# Patient Record
Sex: FEMALE | Race: WHITE | NOT HISPANIC OR LATINO | Employment: OTHER | URBAN - METROPOLITAN AREA
[De-identification: names, ages, dates, MRNs, and addresses within clinical notes are randomized per-mention and may not be internally consistent; named-entity substitution may affect disease eponyms.]

---

## 2017-02-20 ENCOUNTER — HISTORICAL (OUTPATIENT)
Dept: ADMINISTRATIVE | Facility: HOSPITAL | Age: 67
End: 2017-02-20

## 2017-09-26 ENCOUNTER — HISTORICAL (OUTPATIENT)
Dept: ADMINISTRATIVE | Facility: HOSPITAL | Age: 67
End: 2017-09-26

## 2017-09-26 LAB
ABS NEUT (OLG): 3.63 X10(3)/MCL (ref 2.1–9.2)
ANISOCYTOSIS BLD QL SMEAR: 1
BASOPHILS # BLD AUTO: 0 X10(3)/MCL (ref 0–0.2)
BASOPHILS NFR BLD AUTO: 1 %
EOSINOPHIL # BLD AUTO: 0.1 X10(3)/MCL (ref 0–0.9)
EOSINOPHIL NFR BLD AUTO: 1 %
ERYTHROCYTE [DISTWIDTH] IN BLOOD BY AUTOMATED COUNT: 15.4 % (ref 11.5–17)
HCT VFR BLD AUTO: 29.7 % (ref 37–47)
HGB BLD-MCNC: 9 GM/DL (ref 12–16)
IRON SATN MFR SERPL: 3.8 % (ref 20–50)
IRON SERPL-MCNC: 17 MCG/DL (ref 50–175)
LYMPHOCYTES # BLD AUTO: 1.9 X10(3)/MCL (ref 0.6–4.6)
LYMPHOCYTES NFR BLD AUTO: 30 % (ref 13–40)
MCH RBC QN AUTO: 24.9 PG (ref 27–31)
MCHC RBC AUTO-ENTMCNC: 30.3 GM/DL (ref 33–36)
MCV RBC AUTO: 82 FL (ref 80–94)
MICROCYTES BLD QL SMEAR: 1
MONOCYTES # BLD AUTO: 0.5 X10(3)/MCL (ref 0.1–1.3)
MONOCYTES NFR BLD AUTO: 8 %
NEUTROPHILS # BLD AUTO: 3.63 X10(3)/MCL (ref 2.1–9.2)
NEUTROPHILS NFR BLD AUTO: 59 %
OVALOCYTES BLD QL SMEAR: 1
PLATELET # BLD AUTO: 323 X10(3)/MCL (ref 130–400)
PLATELET # BLD EST: NORMAL 10*3/UL
PMV BLD AUTO: 9.7 FL (ref 7.4–10.4)
POIKILOCYTOSIS BLD QL SMEAR: 1
RBC # BLD AUTO: 3.62 X10(6)/MCL (ref 4.2–5.4)
RBC MORPH BLD: NORMAL
RET# (OHS): 0.05 X10^6/ML (ref 0.02–0.08)
RETICULOCYTE COUNT AUTOMATED (OLG): 1.3 % (ref 1.1–2.1)
SCHISTOCYTES BLD QL AUTO: 1
TIBC SERPL-MCNC: 450 MCG/DL (ref 250–450)
TRANSFERRIN SERPL-MCNC: 368 MG/DL (ref 200–360)
VIT B12 SERPL-MCNC: 626 PG/ML (ref 193–986)
WBC # SPEC AUTO: 6.2 X10(3)/MCL (ref 4.5–11.5)

## 2017-09-29 ENCOUNTER — HISTORICAL (OUTPATIENT)
Dept: ADMINISTRATIVE | Facility: HOSPITAL | Age: 67
End: 2017-09-29

## 2017-09-29 LAB
COLOR STL: NORMAL
CONSISTENCY STL: NORMAL

## 2017-10-24 ENCOUNTER — HISTORICAL (OUTPATIENT)
Dept: ADMINISTRATIVE | Facility: HOSPITAL | Age: 67
End: 2017-10-24

## 2017-10-24 LAB
ABS NEUT (OLG): 2.99 X10(3)/MCL (ref 2.1–9.2)
BASOPHILS # BLD AUTO: 0.1 X10(3)/MCL (ref 0–0.2)
BASOPHILS NFR BLD AUTO: 1 %
EOSINOPHIL # BLD AUTO: 0.1 X10(3)/MCL (ref 0–0.9)
EOSINOPHIL NFR BLD AUTO: 2 %
ERYTHROCYTE [DISTWIDTH] IN BLOOD BY AUTOMATED COUNT: 18.9 % (ref 11.5–17)
HCT VFR BLD AUTO: 31.3 % (ref 37–47)
HGB BLD-MCNC: 9.2 GM/DL (ref 12–16)
IRON SATN MFR SERPL: 18.8 % (ref 20–50)
IRON SERPL-MCNC: 81 MCG/DL (ref 50–175)
LYMPHOCYTES # BLD AUTO: 1.6 X10(3)/MCL (ref 0.6–4.6)
LYMPHOCYTES NFR BLD AUTO: 30 %
MCH RBC QN AUTO: 24.7 PG (ref 27–31)
MCHC RBC AUTO-ENTMCNC: 29.4 GM/DL (ref 33–36)
MCV RBC AUTO: 84.1 FL (ref 80–94)
MONOCYTES # BLD AUTO: 0.5 X10(3)/MCL (ref 0.1–1.3)
MONOCYTES NFR BLD AUTO: 9 %
NEUTROPHILS # BLD AUTO: 2.99 X10(3)/MCL (ref 1.4–7.9)
NEUTROPHILS NFR BLD AUTO: 57 %
PLATELET # BLD AUTO: 332 X10(3)/MCL (ref 130–400)
PMV BLD AUTO: 9.6 FL (ref 9.4–12.4)
RBC # BLD AUTO: 3.72 X10(6)/MCL (ref 4.2–5.4)
TIBC SERPL-MCNC: 431 MCG/DL (ref 250–450)
TRANSFERRIN SERPL-MCNC: 356 MG/DL (ref 200–360)
WBC # SPEC AUTO: 5.2 X10(3)/MCL (ref 4.5–11.5)

## 2021-08-16 LAB — BCS RECOMMENDATION EXT: NORMAL

## 2021-09-03 LAB — BMD RECOMMENDATION EXT: NORMAL

## 2021-10-04 LAB
LEFT EYE DM RETINOPATHY: NEGATIVE
RIGHT EYE DM RETINOPATHY: NEGATIVE

## 2021-10-11 LAB — CRC RECOMMENDATION EXT: NORMAL

## 2022-03-23 LAB — HBA1C MFR BLD: 7.9 % (ref 4–6)

## 2022-07-26 RX ORDER — METFORMIN HYDROCHLORIDE 1000 MG/1
TABLET ORAL
Qty: 180 TABLET | Refills: 3 | Status: SHIPPED | OUTPATIENT
Start: 2022-07-26 | End: 2023-08-16

## 2022-07-26 RX ORDER — LISINOPRIL 40 MG/1
TABLET ORAL
Qty: 90 TABLET | Refills: 3 | Status: SHIPPED | OUTPATIENT
Start: 2022-07-26 | End: 2023-08-16

## 2022-07-26 RX ORDER — HYDROCHLOROTHIAZIDE 25 MG/1
TABLET ORAL
Qty: 90 TABLET | Refills: 3 | Status: SHIPPED | OUTPATIENT
Start: 2022-07-26 | End: 2023-08-16

## 2022-10-08 ENCOUNTER — DOCUMENTATION ONLY (OUTPATIENT)
Dept: INTERNAL MEDICINE | Facility: CLINIC | Age: 72
End: 2022-10-08
Payer: MEDICARE

## 2022-10-08 DIAGNOSIS — E03.9 HYPOTHYROIDISM, UNSPECIFIED TYPE: Primary | ICD-10-CM

## 2022-10-11 RX ORDER — LEVOTHYROXINE SODIUM 112 UG/1
TABLET ORAL
Qty: 90 TABLET | Refills: 3 | Status: SHIPPED | OUTPATIENT
Start: 2022-10-11 | End: 2023-10-17

## 2022-10-31 ENCOUNTER — TELEPHONE (OUTPATIENT)
Dept: INTERNAL MEDICINE | Facility: CLINIC | Age: 72
End: 2022-10-31
Payer: MEDICARE

## 2022-11-01 LAB — HBA1C MFR BLD: 7.1 % (ref 4–6)

## 2022-11-07 ENCOUNTER — OFFICE VISIT (OUTPATIENT)
Dept: INTERNAL MEDICINE | Facility: CLINIC | Age: 72
End: 2022-11-07
Payer: MEDICARE

## 2022-11-07 VITALS
TEMPERATURE: 97 F | BODY MASS INDEX: 28.7 KG/M2 | OXYGEN SATURATION: 96 % | DIASTOLIC BLOOD PRESSURE: 69 MMHG | WEIGHT: 152 LBS | HEIGHT: 61 IN | HEART RATE: 70 BPM | SYSTOLIC BLOOD PRESSURE: 134 MMHG

## 2022-11-07 DIAGNOSIS — E03.9 HYPOTHYROIDISM, UNSPECIFIED TYPE: Chronic | ICD-10-CM

## 2022-11-07 DIAGNOSIS — G47.33 OSA (OBSTRUCTIVE SLEEP APNEA): Chronic | ICD-10-CM

## 2022-11-07 DIAGNOSIS — Z79.4 TYPE 2 DIABETES MELLITUS WITHOUT COMPLICATION, WITH LONG-TERM CURRENT USE OF INSULIN: Chronic | ICD-10-CM

## 2022-11-07 DIAGNOSIS — E11.9 TYPE 2 DIABETES MELLITUS WITHOUT COMPLICATION, WITH LONG-TERM CURRENT USE OF INSULIN: Chronic | ICD-10-CM

## 2022-11-07 DIAGNOSIS — I25.10 CORONARY ARTERY DISEASE, UNSPECIFIED VESSEL OR LESION TYPE, UNSPECIFIED WHETHER ANGINA PRESENT, UNSPECIFIED WHETHER NATIVE OR TRANSPLANTED HEART: Primary | Chronic | ICD-10-CM

## 2022-11-07 DIAGNOSIS — E78.2 MIXED HYPERLIPIDEMIA: ICD-10-CM

## 2022-11-07 DIAGNOSIS — I73.9 PAD (PERIPHERAL ARTERY DISEASE): Chronic | ICD-10-CM

## 2022-11-07 DIAGNOSIS — Z79.4 TYPE 2 DIABETES MELLITUS WITHOUT COMPLICATION, WITH LONG-TERM CURRENT USE OF INSULIN: ICD-10-CM

## 2022-11-07 DIAGNOSIS — I10 PRIMARY HYPERTENSION: Chronic | ICD-10-CM

## 2022-11-07 DIAGNOSIS — I25.10 CORONARY ARTERY DISEASE, UNSPECIFIED VESSEL OR LESION TYPE, UNSPECIFIED WHETHER ANGINA PRESENT, UNSPECIFIED WHETHER NATIVE OR TRANSPLANTED HEART: ICD-10-CM

## 2022-11-07 DIAGNOSIS — E78.2 MIXED HYPERLIPIDEMIA: Chronic | ICD-10-CM

## 2022-11-07 DIAGNOSIS — E11.9 TYPE 2 DIABETES MELLITUS WITHOUT COMPLICATION, WITH LONG-TERM CURRENT USE OF INSULIN: ICD-10-CM

## 2022-11-07 PROCEDURE — 99214 OFFICE O/P EST MOD 30 MIN: CPT | Mod: ,,,

## 2022-11-07 PROCEDURE — 99214 PR OFFICE/OUTPT VISIT, EST, LEVL IV, 30-39 MIN: ICD-10-PCS | Mod: ,,,

## 2022-11-07 RX ORDER — METOPROLOL TARTRATE 25 MG/1
25 TABLET, FILM COATED ORAL DAILY
COMMUNITY
Start: 2022-08-17 | End: 2023-12-04

## 2022-11-07 RX ORDER — EVOLOCUMAB 420 MG/3.5
KIT SUBCUTANEOUS
COMMUNITY
Start: 2022-10-24

## 2022-11-07 RX ORDER — ATORVASTATIN CALCIUM 40 MG/1
40 TABLET, FILM COATED ORAL DAILY
COMMUNITY
Start: 2022-11-06

## 2022-11-07 RX ORDER — AMLODIPINE BESYLATE 5 MG/1
5 TABLET ORAL DAILY
COMMUNITY
Start: 2022-07-27

## 2022-11-07 RX ORDER — ASPIRIN 81 MG/1
81 TABLET ORAL DAILY
COMMUNITY

## 2022-11-07 RX ORDER — CLOPIDOGREL BISULFATE 75 MG/1
75 TABLET ORAL DAILY
COMMUNITY
Start: 2022-07-15

## 2022-11-07 RX ORDER — INSULIN GLARGINE 100 [IU]/ML
40 INJECTION, SOLUTION SUBCUTANEOUS DAILY
COMMUNITY
Start: 2022-11-06 | End: 2023-04-24 | Stop reason: SDUPTHER

## 2022-11-07 NOTE — ASSESSMENT & PLAN NOTE
-awaiting most recent HGB A1c salt, request records   -Currently on Jardiance 10 mg daily, Lantus 40 units daily, metformin 1000 mg b.i.d., continue for now and adjust if needed   -low carb diet  -reports up-to-date on diabetic eye exam

## 2022-11-07 NOTE — ASSESSMENT & PLAN NOTE
- did complete TSH prior to visit, requesting records   -continue levothyroxine 112 mcg for now and titrate as needed

## 2022-11-07 NOTE — PROGRESS NOTES
Patient ID: Alexys Madera is a 72 y.o. female.    Chief Complaint: Follow-up     72-year-old female here today for 6 month follow-up visit.  Medical comorbidities include CAD s/p CABG, PAD, HTN, HLD,DMII,  hypothyroidism, and DILLON.  Since last visit patient reports has been fairly well without any acute injury or major illness.  Did recently complete some blood work, however have not receive from [facility].  Staff requested.  Age-appropriate screenings reviewed and up-to-date.  Immunizations reviewed, is up for high-dose influenza however clinic is out of stock.  Encouraged to obtain at local pharmacy.  Does have type 2 diabetes currently on metformin, Jardiance, and Lantus.  Reports typical blood sugars in the 80-90s in the morning. Discussed  reviewing HGB A1c and TSH level once received in given her call back.  Patient understanding.  Denies any acute medical complaints for today's visit and is feeling generally well.      Wellness: Next visit      MEDICAL HISTORY:    Past Medical History:   Diagnosis Date    CAD (coronary artery disease) 11/7/2022    Hypothyroidism 11/7/2022    Mixed hyperlipidemia 11/7/2022    DILLON (obstructive sleep apnea) 11/7/2022    PAD (peripheral artery disease) 11/7/2022    Primary hypertension 11/7/2022    Type 2 diabetes mellitus without complication, with long-term current use of insulin 11/7/2022      Past Surgical History:   Procedure Laterality Date    CARDIAC SURGERY        Social History     Tobacco Use    Smoking status: Never    Smokeless tobacco: Never   Substance Use Topics    Alcohol use: Not Currently          Health Maintenance Due   Topic Date Due    Hepatitis C Screening  Never done    Shingles Vaccine (2 of 3) 12/08/2016    Pneumococcal Vaccines (Age 65+) (2 - PCV) 09/29/2017    COVID-19 Vaccine (4 - Booster for Moderna series) 01/12/2022    Influenza Vaccine (1) 09/01/2022          Patient Care Team:  Edenilson Redmond MD as PCP - General (Internal  "Medicine)  MD Ximena Zamudio MD as Consulting Physician (Cardiology)      Review of Systems   Constitutional:  Negative for fatigue and fever.   HENT:  Negative for congestion, rhinorrhea, sore throat and trouble swallowing.    Eyes:  Negative for redness and visual disturbance.   Respiratory:  Negative for cough, chest tightness and shortness of breath.    Cardiovascular:  Negative for chest pain and palpitations.   Gastrointestinal:  Negative for abdominal pain, constipation, diarrhea, nausea and vomiting.   Genitourinary:  Negative for dysuria, flank pain, frequency and urgency.   Musculoskeletal:  Negative for arthralgias, gait problem and myalgias.   Skin:  Negative for rash and wound.   Neurological:  Negative for facial asymmetry, speech difficulty, weakness and headaches.   All other systems reviewed and are negative.    Objective:   /69 (BP Location: Right arm, Patient Position: Sitting, BP Method: Large (Automatic))   Pulse 70   Temp 97.3 °F (36.3 °C)   Ht 5' 1" (1.549 m)   Wt 68.9 kg (152 lb)   SpO2 96%   BMI 28.72 kg/m²      Physical Exam  Constitutional:       General: She is not in acute distress.     Appearance: Normal appearance.   HENT:      Right Ear: Tympanic membrane, ear canal and external ear normal.      Left Ear: Tympanic membrane, ear canal and external ear normal.      Nose: Nose normal.      Mouth/Throat:      Mouth: Mucous membranes are moist.      Pharynx: Oropharynx is clear.   Eyes:      Extraocular Movements: Extraocular movements intact.      Conjunctiva/sclera: Conjunctivae normal.      Pupils: Pupils are equal, round, and reactive to light.   Cardiovascular:      Rate and Rhythm: Normal rate and regular rhythm.      Pulses: Normal pulses.      Heart sounds: Normal heart sounds. No murmur heard.    No gallop.   Pulmonary:      Effort: Pulmonary effort is normal.      Breath sounds: Normal breath sounds. No wheezing.   Abdominal:      General: Bowel " sounds are normal. There is no distension.      Palpations: Abdomen is soft. There is no mass.      Tenderness: There is no abdominal tenderness. There is no guarding.   Musculoskeletal:         General: Normal range of motion.      Right foot: Normal range of motion. No deformity or foot drop.      Left foot: Normal range of motion. No deformity or foot drop.   Feet:      Right foot:      Protective Sensation: 3 sites tested.  3 sites sensed.      Skin integrity: Skin integrity normal. No ulcer, skin breakdown or erythema.      Toenail Condition: Right toenails are normal.      Left foot:      Protective Sensation: 3 sites tested.  3 sites sensed.      Skin integrity: Skin integrity normal. No ulcer, skin breakdown or erythema.      Toenail Condition: Left toenails are normal.   Skin:     General: Skin is warm and dry.   Neurological:      Mental Status: She is alert. Mental status is at baseline.      Sensory: No sensory deficit.      Motor: No weakness.         Assessment:       ICD-10-CM ICD-9-CM   1. Type 2 diabetes mellitus without complication, with long-term current use of insulin  E11.9 250.00    Z79.4 V58.67   2. Hypothyroidism, unspecified type  E03.9 244.9   3. Coronary artery disease, unspecified vessel or lesion type, unspecified whether angina present, unspecified whether native or transplanted heart  I25.10 414.00   4. PAD (peripheral artery disease)  I73.9 443.9   5. Mixed hyperlipidemia  E78.2 272.2   6. Primary hypertension  I10 401.9   7. DILLON (obstructive sleep apnea)  G47.33 327.23        Plan:     Problem List Items Addressed This Visit          Cardiac/Vascular    CAD (coronary artery disease) (Chronic)     S/p CABG and establish cardiology   -currently on ASA, beta-blocker, Ace, and Repatha, continue         Mixed hyperlipidemia (Chronic)      -currently on Repatha, continue  -low-cholesterol diet         Primary hypertension (Chronic)      -currently well controlled on amlodipine 5 mg,  lisinopril 40 mg and HCTZ 25 mg, continue  -low-sodium diet         PAD (peripheral artery disease) (Chronic)      -currently on ASA and Repatha, continue            Endocrine    Hypothyroidism (Chronic)      - did complete TSH prior to visit, requesting records   -continue levothyroxine 112 mcg for now and titrate as needed         Type 2 diabetes mellitus without complication, with long-term current use of insulin (Chronic)      -awaiting most recent HGB A1c salt, request records   -Currently on Jardiance 10 mg daily, Lantus 40 units daily, metformin 1000 mg b.i.d., continue for now and adjust if needed   -low carb diet  -reports up-to-date on diabetic eye exam         Relevant Medications    LANTUS SOLOSTAR U-100 INSULIN glargine 100 units/mL SubQ pen       Other    DILLON (obstructive sleep apnea) (Chronic)      -encourage device compliance   -device hygiene encouraged as well               Follow up in about 6 months (around 5/7/2023) for Medicare Wellness.   -plan specifics discussed above          Medication List with Changes/Refills   Current Medications    AMLODIPINE (NORVASC) 5 MG TABLET    Take 5 mg by mouth once daily.    ASPIRIN (ECOTRIN) 81 MG EC TABLET    Take 81 mg by mouth once daily.    ATORVASTATIN (LIPITOR) 40 MG TABLET    Take 40 mg by mouth once daily.    CLOPIDOGREL (PLAVIX) 75 MG TABLET    Take 75 mg by mouth once daily.    EMPAGLIFLOZIN (JARDIANCE) 10 MG TABLET    Take 10 mg by mouth once daily.    HYDROCHLOROTHIAZIDE (HYDRODIURIL) 25 MG TABLET    TAKE 1 TABLET BY MOUTH  DAILY    LANTUS SOLOSTAR U-100 INSULIN GLARGINE 100 UNITS/ML SUBQ PEN    Inject 40 Units into the skin once daily.    LEVOTHYROXINE (SYNTHROID) 112 MCG TABLET    TAKE 1 TABLET BY MOUTH  DAILY    LISINOPRIL (PRINIVIL,ZESTRIL) 40 MG TABLET    TAKE 1 TABLET BY MOUTH  DAILY    METFORMIN (GLUCOPHAGE) 1000 MG TABLET    TAKE 1 TABLET BY MOUTH  TWICE DAILY    METOPROLOL TARTRATE (LOPRESSOR) 25 MG TABLET    Take 25 mg by mouth once  daily.    REPATHA PUSHTRONEX 420 MG/3.5 ML INJT    SMARTSI SUB-Q Once a Month

## 2022-11-07 NOTE — PROGRESS NOTES
Faxed in HGB A1c reviewed indicating 7.1.  Spoke to patient discussed results.  Currently on Jardiance 10 mg will increase dose to Jardiance 25 mg daily.

## 2022-11-14 ENCOUNTER — DOCUMENTATION ONLY (OUTPATIENT)
Dept: INTERNAL MEDICINE | Facility: CLINIC | Age: 72
End: 2022-11-14
Payer: MEDICARE

## 2023-04-24 DIAGNOSIS — Z79.4 TYPE 2 DIABETES MELLITUS WITHOUT COMPLICATION, WITH LONG-TERM CURRENT USE OF INSULIN: Primary | ICD-10-CM

## 2023-04-24 DIAGNOSIS — E11.9 TYPE 2 DIABETES MELLITUS WITHOUT COMPLICATION, WITH LONG-TERM CURRENT USE OF INSULIN: Primary | ICD-10-CM

## 2023-04-24 RX ORDER — INSULIN GLARGINE 100 [IU]/ML
40 INJECTION, SOLUTION SUBCUTANEOUS DAILY
Qty: 3 ML | Refills: 3 | Status: SHIPPED | OUTPATIENT
Start: 2023-04-24 | End: 2023-05-11

## 2023-05-01 ENCOUNTER — TELEPHONE (OUTPATIENT)
Dept: INTERNAL MEDICINE | Facility: CLINIC | Age: 73
End: 2023-05-01
Payer: MEDICARE

## 2023-05-03 ENCOUNTER — LAB VISIT (OUTPATIENT)
Dept: LAB | Facility: HOSPITAL | Age: 73
End: 2023-05-03
Payer: MEDICARE

## 2023-05-03 DIAGNOSIS — I25.10 CORONARY ARTERY DISEASE, UNSPECIFIED VESSEL OR LESION TYPE, UNSPECIFIED WHETHER ANGINA PRESENT, UNSPECIFIED WHETHER NATIVE OR TRANSPLANTED HEART: ICD-10-CM

## 2023-05-03 DIAGNOSIS — G47.33 OSA (OBSTRUCTIVE SLEEP APNEA): ICD-10-CM

## 2023-05-03 DIAGNOSIS — E78.2 MIXED HYPERLIPIDEMIA: ICD-10-CM

## 2023-05-03 DIAGNOSIS — E11.9 TYPE 2 DIABETES MELLITUS WITHOUT COMPLICATION, WITH LONG-TERM CURRENT USE OF INSULIN: ICD-10-CM

## 2023-05-03 DIAGNOSIS — I73.9 PAD (PERIPHERAL ARTERY DISEASE): ICD-10-CM

## 2023-05-03 DIAGNOSIS — E03.9 HYPOTHYROIDISM, UNSPECIFIED TYPE: ICD-10-CM

## 2023-05-03 DIAGNOSIS — Z79.4 TYPE 2 DIABETES MELLITUS WITHOUT COMPLICATION, WITH LONG-TERM CURRENT USE OF INSULIN: ICD-10-CM

## 2023-05-03 DIAGNOSIS — I10 PRIMARY HYPERTENSION: ICD-10-CM

## 2023-05-03 PROCEDURE — 85025 COMPLETE CBC W/AUTO DIFF WBC: CPT

## 2023-05-03 PROCEDURE — 84443 ASSAY THYROID STIM HORMONE: CPT

## 2023-05-03 PROCEDURE — 36415 COLL VENOUS BLD VENIPUNCTURE: CPT

## 2023-05-03 PROCEDURE — 83036 HEMOGLOBIN GLYCOSYLATED A1C: CPT

## 2023-05-03 PROCEDURE — 80053 COMPREHEN METABOLIC PANEL: CPT

## 2023-05-03 PROCEDURE — 80061 LIPID PANEL: CPT

## 2023-05-08 ENCOUNTER — OFFICE VISIT (OUTPATIENT)
Dept: INTERNAL MEDICINE | Facility: CLINIC | Age: 73
End: 2023-05-08
Payer: MEDICARE

## 2023-05-08 VITALS
SYSTOLIC BLOOD PRESSURE: 132 MMHG | RESPIRATION RATE: 18 BRPM | HEART RATE: 80 BPM | WEIGHT: 150.38 LBS | DIASTOLIC BLOOD PRESSURE: 78 MMHG | TEMPERATURE: 97 F | OXYGEN SATURATION: 98 % | BODY MASS INDEX: 28.42 KG/M2

## 2023-05-08 DIAGNOSIS — E11.9 TYPE 2 DIABETES MELLITUS WITHOUT COMPLICATION, WITH LONG-TERM CURRENT USE OF INSULIN: Chronic | ICD-10-CM

## 2023-05-08 DIAGNOSIS — I10 PRIMARY HYPERTENSION: Chronic | ICD-10-CM

## 2023-05-08 DIAGNOSIS — E78.2 MIXED HYPERLIPIDEMIA: Chronic | ICD-10-CM

## 2023-05-08 DIAGNOSIS — Z23 NEED FOR PNEUMOCOCCAL VACCINATION: Primary | ICD-10-CM

## 2023-05-08 DIAGNOSIS — Z79.4 TYPE 2 DIABETES MELLITUS WITHOUT COMPLICATION, WITH LONG-TERM CURRENT USE OF INSULIN: Chronic | ICD-10-CM

## 2023-05-08 DIAGNOSIS — G47.33 OSA (OBSTRUCTIVE SLEEP APNEA): Chronic | ICD-10-CM

## 2023-05-08 DIAGNOSIS — Z00.00 MEDICARE ANNUAL WELLNESS VISIT, SUBSEQUENT: ICD-10-CM

## 2023-05-08 DIAGNOSIS — I25.10 CORONARY ARTERY DISEASE, UNSPECIFIED VESSEL OR LESION TYPE, UNSPECIFIED WHETHER ANGINA PRESENT, UNSPECIFIED WHETHER NATIVE OR TRANSPLANTED HEART: Chronic | ICD-10-CM

## 2023-05-08 DIAGNOSIS — I73.9 PAD (PERIPHERAL ARTERY DISEASE): Chronic | ICD-10-CM

## 2023-05-08 DIAGNOSIS — E03.9 HYPOTHYROIDISM, UNSPECIFIED TYPE: Chronic | ICD-10-CM

## 2023-05-08 LAB
ALBUMIN SERPL-MCNC: 3.9 G/DL (ref 3.4–4.8)
ALBUMIN/GLOB SERPL: 1.3 RATIO (ref 1.1–2)
ALP SERPL-CCNC: 54 UNIT/L (ref 40–150)
ALT SERPL-CCNC: 19 UNIT/L (ref 0–55)
AST SERPL-CCNC: 16 UNIT/L (ref 5–34)
BASOPHILS # BLD AUTO: 0.05 X10(3)/MCL
BASOPHILS NFR BLD AUTO: 0.8 %
BILIRUBIN DIRECT+TOT PNL SERPL-MCNC: 0.4 MG/DL
BUN SERPL-MCNC: 27.7 MG/DL (ref 9.8–20.1)
CALCIUM SERPL-MCNC: 9.7 MG/DL (ref 8.4–10.2)
CHLORIDE SERPL-SCNC: 106 MMOL/L (ref 98–107)
CHOLEST SERPL-MCNC: 123 MG/DL
CHOLEST/HDLC SERPL: 3 {RATIO} (ref 0–5)
CO2 SERPL-SCNC: 30 MMOL/L (ref 23–31)
CREAT SERPL-MCNC: 0.79 MG/DL (ref 0.55–1.02)
EOSINOPHIL # BLD AUTO: 0.23 X10(3)/MCL (ref 0–0.9)
EOSINOPHIL NFR BLD AUTO: 3.8 %
ERYTHROCYTE [DISTWIDTH] IN BLOOD BY AUTOMATED COUNT: 12.8 % (ref 11.5–17)
EST. AVERAGE GLUCOSE BLD GHB EST-MCNC: 168.6 MG/DL
GFR SERPLBLD CREATININE-BSD FMLA CKD-EPI: >60 MLS/MIN/1.73/M2
GLOBULIN SER-MCNC: 3.1 GM/DL (ref 2.4–3.5)
GLUCOSE SERPL-MCNC: 138 MG/DL (ref 82–115)
HBA1C MFR BLD: 7.5 %
HCT VFR BLD AUTO: 43.8 % (ref 37–47)
HDLC SERPL-MCNC: 40 MG/DL (ref 35–60)
HGB BLD-MCNC: 14 G/DL (ref 12–16)
IMM GRANULOCYTES # BLD AUTO: 0.03 X10(3)/MCL (ref 0–0.04)
IMM GRANULOCYTES NFR BLD AUTO: 0.5 %
LDLC SERPL CALC-MCNC: 58 MG/DL (ref 50–140)
LYMPHOCYTES # BLD AUTO: 2.08 X10(3)/MCL (ref 0.6–4.6)
LYMPHOCYTES NFR BLD AUTO: 34.6 %
MCH RBC QN AUTO: 31.9 PG (ref 27–31)
MCHC RBC AUTO-ENTMCNC: 32 G/DL (ref 33–36)
MCV RBC AUTO: 99.8 FL (ref 80–94)
MONOCYTES # BLD AUTO: 0.43 X10(3)/MCL (ref 0.1–1.3)
MONOCYTES NFR BLD AUTO: 7.1 %
NEUTROPHILS # BLD AUTO: 3.2 X10(3)/MCL (ref 2.1–9.2)
NEUTROPHILS NFR BLD AUTO: 53.2 %
NRBC BLD AUTO-RTO: 0 %
PLATELET # BLD AUTO: 306 X10(3)/MCL (ref 130–400)
PMV BLD AUTO: 9.8 FL (ref 7.4–10.4)
POTASSIUM SERPL-SCNC: 3.9 MMOL/L (ref 3.5–5.1)
PROT SERPL-MCNC: 7 GM/DL (ref 5.8–7.6)
RBC # BLD AUTO: 4.39 X10(6)/MCL (ref 4.2–5.4)
SODIUM SERPL-SCNC: 143 MMOL/L (ref 136–145)
TRIGL SERPL-MCNC: 127 MG/DL (ref 37–140)
TSH SERPL-ACNC: 1.71 UIU/ML (ref 0.35–4.94)
VLDLC SERPL CALC-MCNC: 25 MG/DL
WBC # SPEC AUTO: 6.02 X10(3)/MCL (ref 4.5–11.5)

## 2023-05-08 PROCEDURE — G0009 PNEUMOCOCCAL CONJUGATE VACCINE 20-VALENT: ICD-10-PCS | Mod: ,,,

## 2023-05-08 PROCEDURE — 90677 PNEUMOCOCCAL CONJUGATE VACCINE 20-VALENT: ICD-10-PCS | Mod: ,,,

## 2023-05-08 PROCEDURE — G0439 PR MEDICARE ANNUAL WELLNESS SUBSEQUENT VISIT: ICD-10-PCS | Mod: ,,,

## 2023-05-08 PROCEDURE — G0009 ADMIN PNEUMOCOCCAL VACCINE: HCPCS | Mod: ,,,

## 2023-05-08 PROCEDURE — G0439 PPPS, SUBSEQ VISIT: HCPCS | Mod: ,,,

## 2023-05-08 PROCEDURE — 90677 PCV20 VACCINE IM: CPT | Mod: ,,,

## 2023-05-08 RX ORDER — AMOXICILLIN 500 MG/1
500 CAPSULE ORAL 3 TIMES DAILY
COMMUNITY
Start: 2023-05-04 | End: 2023-08-14

## 2023-05-08 NOTE — ASSESSMENT & PLAN NOTE
-s/p CABG and established with Cardiology   -currently on ASA, Plavix, beta-blocker, Ace, and Repatha, continue

## 2023-05-08 NOTE — ASSESSMENT & PLAN NOTE
-awaiting most recent HGB A1c, error in lab.  Likely will have to get redrawn.    -currently on Jardiance 25 mg, Lantus 40 units, metformin b.i.d., continue  -low carb diet   -DM eye exam up-to-date   -complete diabetic foot exam with Podiatry

## 2023-05-08 NOTE — ASSESSMENT & PLAN NOTE
-stable and well controlled   -currently on amlodipine 5, metoprolol tartrate 25 mg, lisinopril 40, HCTZ 25, continue  -low-sodium diet

## 2023-05-08 NOTE — PROGRESS NOTES
"     Patient Care Team:  Edenilson Redmond MD as PCP - General (Internal Medicine)  MD Ximena Zamudio MD as Consulting Physician (Cardiology)      Patient ID: Alexys Madera is a 73 y.o. female.    Chief Complaint: Medicare AWV (Pt doing well)      HPI:      73-year-old female here today for wellness visit.  Medical comorbidities include CAD s/p CABG, PAD, HTN, HLD,DMII,  hypothyroidism, and DILLON.  Since last visit patient reports has been fairly well without any acute injury or major illness.  Did recently complete some blood work, however labs still stating "in process" for labs completed 5 days ago.  Apologize to patient on behalf of lab, likely not enough blood or sample loss.  We will reach out to lab and find out white blood work is still sitting in process after 5 days.  Does have results for UA with positive urine culture for E coli, however noted to be asymptomatic UTI therefore will hold off on treating.  Previously with elevated HGB A1c 7.1, no recent A1c as stated before.  Is currently on Jardiance 25 mg stating only has few medications left.  Unable to feel since stating assistance program forms not fully filled.  Will have staff look into.  Given 2 week samples of Jardiance to hold over until paperwork be completed by our staff. Immunizations reviewed and patient agreeable to have pneumococcal vaccine today.  Age-appropriate screenings reviewed and up-to-date.Denies any acute medical complaints for today's visit and is feeling generally well.       Wellness:  Today 05/08/2023      MEDICAL HISTORY:    Past Medical History:   Diagnosis Date    CAD (coronary artery disease) 11/7/2022    Hypothyroidism 11/7/2022    Mixed hyperlipidemia 11/7/2022    DILLON (obstructive sleep apnea) 11/7/2022    PAD (peripheral artery disease) 11/7/2022    Primary hypertension 11/7/2022    Type 2 diabetes mellitus without complication, with long-term current use of insulin 11/7/2022      Past Surgical " History:   Procedure Laterality Date    CARDIAC SURGERY        Social History     Tobacco Use    Smoking status: Never    Smokeless tobacco: Never   Substance Use Topics    Alcohol use: Not Currently    Drug use: Never          Health Maintenance Due   Topic Date Due    Hepatitis C Screening  Never done    Diabetes Urine Screening  Never done    Foot Exam  Never done    Shingles Vaccine (2 of 3) 12/08/2016        SUBJECTIVE:     Review of Systems   Constitutional:  Negative for fatigue and fever.   HENT:  Negative for congestion, rhinorrhea, sore throat and trouble swallowing.    Eyes:  Negative for redness and visual disturbance.   Respiratory:  Negative for cough, chest tightness and shortness of breath.    Cardiovascular:  Negative for chest pain and palpitations.   Gastrointestinal:  Negative for abdominal pain, constipation, diarrhea, nausea and vomiting.   Genitourinary:  Negative for dysuria, flank pain, frequency and urgency.   Musculoskeletal:  Negative for arthralgias, gait problem and myalgias.   Skin:  Negative for rash and wound.   Neurological:  Negative for facial asymmetry, speech difficulty, weakness and headaches.   All other systems reviewed and are negative.      Patient Reported Health Risk Assessment  What [age]  Are you male or female?: Female  During the past four weeks, how much have you been bothered by emotional problems such as feeling anxious, depressed, irritable, sad, or downhearted and blue?: Not at all  During the past five weeks, has your physical and/or emotional health limited your social activities with family, friends, neighbors, or groups?: Not at all  During the past four weeks, how much bodily pain have you generally had?: No pain  During the past four weeks, was someone available to help if you needed and wanted help?: Yes, as much as I wanted  During the past four weeks, what was the hardest physical activity you could do for at least two minutes?: Very  heavy  Can you get to places out of walking distance without help?  (For example, can you travel alone on buses or taxis, or drive your own car?): Yes  Can you go shopping for groceries or clothes without someone's help?: Yes  Can you prepare your own meals?: Yes  Can you do your own housework without help?: Yes  Because of any health problems, do you need the help of another person with your personal care needs such as eating, bathing, dressing, or getting around the house?: No  Can you handle your own money without help?: Yes  During the past four weeks, how would you rate your health in general?: Excellent  How have things been going for you during the past four weeks?: Very well  Are you having difficulties driving your car?: No  Do you always fasten your seat belt when you are in a car?: Yes, usually  How often in the past four weeks have you been bothered by falling or dizzy when standing up?: Never  How often in the past four weeks have you been bothered by sexual problems?: Never  How often in the past four weeks have you been bothered by trouble eating well?: Never  How often in the past four weeks have you been bothered by teeth or denture problems?: Never  How often in the past four weeks have you been bothered with problems using the telephone?: Never  How often in the past four weeks have you been bothered by tiredness or fatigue?: Never  Have you fallen two or more times in the past year?: No  Are you afraid of falling?: No  Are you a smoker?: No  During the past four weeks, how many drinks of wine, beer, or other alcoholic beverages did you have?: No alcohol at all  Do you exercise for about 20 minutes three or more days a week?: Yes, most of the time  Have you been given any information to help you with hazards in your house that might hurt you?: Yes  Have you been given any information to help you with keeping track of your medications?: Yes  How often do you have trouble taking medicines the way  you've been told to take them?: I always take them as prescribed  How confident are you that you can control and manage most of your health problems?: Very confident  What is your race? (Check all that apply.):     OBJECTIVE:     /78 (BP Location: Left arm, Patient Position: Sitting, BP Method: Small (Automatic))   Pulse 80   Temp 97.3 °F (36.3 °C) (Temporal)   Resp 18   Wt 68.2 kg (150 lb 6.4 oz)   SpO2 98%   BMI 28.42 kg/m²      Physical Exam  Constitutional:       General: She is not in acute distress.     Appearance: Normal appearance.   HENT:      Right Ear: Tympanic membrane, ear canal and external ear normal.      Left Ear: Tympanic membrane, ear canal and external ear normal.      Nose: Nose normal.      Mouth/Throat:      Mouth: Mucous membranes are moist.      Pharynx: Oropharynx is clear.   Eyes:      Extraocular Movements: Extraocular movements intact.      Conjunctiva/sclera: Conjunctivae normal.      Pupils: Pupils are equal, round, and reactive to light.   Cardiovascular:      Rate and Rhythm: Normal rate and regular rhythm.      Pulses: Normal pulses.      Heart sounds: Normal heart sounds. No murmur heard.    No gallop.   Pulmonary:      Effort: Pulmonary effort is normal.      Breath sounds: Normal breath sounds. No wheezing.   Abdominal:      General: Bowel sounds are normal. There is no distension.      Palpations: Abdomen is soft. There is no mass.      Tenderness: There is no abdominal tenderness. There is no guarding.   Musculoskeletal:         General: Normal range of motion.   Skin:     General: Skin is warm and dry.   Neurological:      Mental Status: She is alert. Mental status is at baseline.      Sensory: No sensory deficit.      Motor: No weakness.           No flowsheet data found.  Fall Risk Assessment - Outpatient 5/8/2023 11/7/2022   Mobility Status Ambulatory Ambulatory   Number of falls 0 0   Identified as fall risk 0 0                 Depression  Screening  Over the past two weeks, has the patient felt down, depressed, or hopeless?: No  Over the past two weeks, has the patient felt little interest or pleasure in doing things?: No  Functional Ability/Safety Screening  Was the patient's timed Up & Go test unsteady or longer than 30 seconds?: No  Does the patient need help with phone, transportation, shopping, preparing meals, housework, laundry, meds, or managing money?: No  Does the patient's home have rugs in the hallway, lack grab bars in the bathroom, lack handrails on the stairs or have poor lighting?: No  Have you noticed any hearing difficulties?: No  Cognitive Function (Assessed through direct observation with due consideration of information obtained by way of patient reports and/or concerns raised by family, friends, caretakers, or others)    Does the patient repeat questions/statements in the same day?: No  Does the patient have trouble remembering the date, year, and time?: No  Does the patient have difficulty managing finances?: No  Does the patient have a decreased sense of direction?: No      ASSEMENT/ PLAN:       ICD-10-CM ICD-9-CM   1. Need for pneumococcal vaccination  Z23 V03.82   2. Type 2 diabetes mellitus without complication, with long-term current use of insulin  E11.9 250.00    Z79.4 V58.67   3. Primary hypertension  I10 401.9   4. PAD (peripheral artery disease)  I73.9 443.9   5. DILLON (obstructive sleep apnea)  G47.33 327.23   6. Mixed hyperlipidemia  E78.2 272.2   7. Hypothyroidism, unspecified type  E03.9 244.9   8. Coronary artery disease, unspecified vessel or lesion type, unspecified whether angina present, unspecified whether native or transplanted heart  I25.10 414.00   9. Medicare annual wellness visit, subsequent  Z00.00 V70.0         Plan:     Problem List Items Addressed This Visit          Cardiac/Vascular    CAD (coronary artery disease) (Chronic)     -s/p CABG and established with Cardiology   -currently on ASA, Plavix,  beta-blocker, Ace, and Repatha, continue           Mixed hyperlipidemia (Chronic)     -awaiting lipid profile results   -currently on Repatha, continue  -low-cholesterol diet           Primary hypertension (Chronic)     -stable and well controlled   -currently on amlodipine 5, metoprolol tartrate 25 mg, lisinopril 40, HCTZ 25, continue  -low-sodium diet           PAD (peripheral artery disease) (Chronic)     -stable   -currently on ASA, Plavix, and Repatha, continue              Endocrine    Hypothyroidism (Chronic)     -awaiting TSH results, likely will have to get redrawn   -currently on levothyroxine 112 mcg daily, titrate as need           Type 2 diabetes mellitus without complication, with long-term current use of insulin (Chronic)     -awaiting most recent HGB A1c, error in lab.  Likely will have to get redrawn.    -currently on Jardiance 25 mg, Lantus 40 units, metformin b.i.d., continue  -low carb diet   -DM eye exam up-to-date   -complete diabetic foot exam with Podiatry              Other    DILLON (obstructive sleep apnea) (Chronic)     -encourage device compliance   -encourage device hygiene           Medicare annual wellness visit, subsequent     -patient feeling generally well today  -will obtain wellness labs within next few days (lab error possibly lost sample)  -age-appropriate screenings up-to-date  -immunizations reviewed, administer pneumococcal 20 today  -encourage routine aerobic exercise 2 to 3 times a week  -increase fluid hydration            Other Visit Diagnoses       Need for pneumococcal vaccination    -  Primary    Relevant Orders    (In Office Administered) Pneumococcal Conjugate Vaccine (20 Valent) (IM) (Completed)            Advance Care Planning   I attest to discussing Advance Care Planning with patient and/or family member.  Education was provided including the importance of the Health Care Power of , Advance Directives, and/or [place] documentation.       Opioid Screening:  Patient medication list reviewed, patient is not taking prescription opioids. Patient is not using additional opioids than prescribed. Patient is at low risk of substance abuse based on this opioid use history.         Follow up in about 3 months (around 2023) for Medication Evaluation, with labs prior.   -plan specifics discussed above    Orders Placed This Encounter    (In Office Administered) Pneumococcal Conjugate Vaccine (20 Valent) (IM)        Medication List with Changes/Refills   Current Medications    AMLODIPINE (NORVASC) 5 MG TABLET    Take 5 mg by mouth once daily.    AMOXICILLIN (AMOXIL) 500 MG CAPSULE    Take 500 mg by mouth 3 (three) times daily.    ASPIRIN (ECOTRIN) 81 MG EC TABLET    Take 81 mg by mouth once daily.    ATORVASTATIN (LIPITOR) 40 MG TABLET    Take 40 mg by mouth once daily.    CLOPIDOGREL (PLAVIX) 75 MG TABLET    Take 75 mg by mouth once daily.    EMPAGLIFLOZIN (JARDIANCE) 25 MG TABLET    Take 1 tablet by mouth once daily.    HYDROCHLOROTHIAZIDE (HYDRODIURIL) 25 MG TABLET    TAKE 1 TABLET BY MOUTH  DAILY    LANTUS SOLOSTAR U-100 INSULIN GLARGINE 100 UNITS/ML SUBQ PEN    Inject 40 Units into the skin once daily.    LEVOTHYROXINE (SYNTHROID) 112 MCG TABLET    TAKE 1 TABLET BY MOUTH  DAILY    LISINOPRIL (PRINIVIL,ZESTRIL) 40 MG TABLET    TAKE 1 TABLET BY MOUTH  DAILY    METFORMIN (GLUCOPHAGE) 1000 MG TABLET    TAKE 1 TABLET BY MOUTH  TWICE DAILY    METOPROLOL TARTRATE (LOPRESSOR) 25 MG TABLET    Take 25 mg by mouth once daily.    REPATHA PUSHTRONEX 420 MG/3.5 ML INJT    SMARTSI SUB-Q Once a Month

## 2023-05-08 NOTE — ASSESSMENT & PLAN NOTE
-awaiting TSH results, likely will have to get redrawn   -currently on levothyroxine 112 mcg daily, titrate as need

## 2023-05-08 NOTE — ASSESSMENT & PLAN NOTE
-patient feeling generally well today  -will obtain wellness labs within next few days (lab error possibly lost sample)  -age-appropriate screenings up-to-date  -immunizations reviewed, administer pneumococcal 20 today  -encourage routine aerobic exercise 2 to 3 times a week  -increase fluid hydration

## 2023-05-11 ENCOUNTER — TELEPHONE (OUTPATIENT)
Dept: INTERNAL MEDICINE | Facility: CLINIC | Age: 73
End: 2023-05-11
Payer: MEDICARE

## 2023-05-11 DIAGNOSIS — Z79.4 TYPE 2 DIABETES MELLITUS WITHOUT COMPLICATION, WITH LONG-TERM CURRENT USE OF INSULIN: ICD-10-CM

## 2023-05-11 DIAGNOSIS — E11.9 TYPE 2 DIABETES MELLITUS WITHOUT COMPLICATION, WITH LONG-TERM CURRENT USE OF INSULIN: ICD-10-CM

## 2023-05-11 RX ORDER — INSULIN GLARGINE 100 [IU]/ML
40 INJECTION, SOLUTION SUBCUTANEOUS DAILY
Qty: 3 ML | Refills: 3 | Status: SHIPPED | OUTPATIENT
Start: 2023-05-11 | End: 2024-02-10

## 2023-05-11 NOTE — TELEPHONE ENCOUNTER
Spoke to patient discussed A1c 7.5 worsened from previous 7.1   -currently on Lantus 35 units nightly, increase to 40 units nightly   -also on metformin 1000 b.i.d., continue  -currently on Jardiance 25 mg daily, continue   -highly stressed importance of maintaining diabetic diet as well as routine aerobic exercise   -orders placed for repeat A1c CMP next visit

## 2023-06-12 ENCOUNTER — TELEPHONE (OUTPATIENT)
Dept: INTERNAL MEDICINE | Facility: CLINIC | Age: 73
End: 2023-06-12
Payer: MEDICARE

## 2023-06-12 NOTE — TELEPHONE ENCOUNTER
----- Message from Lisa Alicia sent at 6/12/2023  1:36 PM CDT -----  .Type:  Needs Medical Advice    Who Called: pt  Symptoms (please be specific):    How long has patient had these symptoms:    Pharmacy name and phone #:    Would the patient rather a call back or a response via [facility]?   Best Call Back Number: [phone]  Additional Information: empagliflozin (JARDIANCE) 25 mg tablet please call pt back her pharmacy waiting to hear back from clinic about her paperwork for medication program pt running out of meds

## 2023-08-03 ENCOUNTER — LAB VISIT (OUTPATIENT)
Dept: LAB | Facility: HOSPITAL | Age: 73
End: 2023-08-03
Attending: INTERNAL MEDICINE
Payer: MEDICARE

## 2023-08-03 DIAGNOSIS — Z79.4 TYPE 2 DIABETES MELLITUS WITHOUT COMPLICATION, WITH LONG-TERM CURRENT USE OF INSULIN: ICD-10-CM

## 2023-08-03 DIAGNOSIS — E11.9 TYPE 2 DIABETES MELLITUS WITHOUT COMPLICATION, WITH LONG-TERM CURRENT USE OF INSULIN: ICD-10-CM

## 2023-08-03 LAB
ALBUMIN SERPL-MCNC: 4 G/DL (ref 3.4–4.8)
ALBUMIN/GLOB SERPL: 1.5 RATIO (ref 1.1–2)
ALP SERPL-CCNC: 47 UNIT/L (ref 40–150)
ALT SERPL-CCNC: 17 UNIT/L (ref 0–55)
AST SERPL-CCNC: 15 UNIT/L (ref 5–34)
BILIRUBIN DIRECT+TOT PNL SERPL-MCNC: 0.5 MG/DL
BUN SERPL-MCNC: 22.2 MG/DL (ref 9.8–20.1)
CALCIUM SERPL-MCNC: 9.9 MG/DL (ref 8.4–10.2)
CHLORIDE SERPL-SCNC: 102 MMOL/L (ref 98–107)
CO2 SERPL-SCNC: 30 MMOL/L (ref 23–31)
CREAT SERPL-MCNC: 0.72 MG/DL (ref 0.55–1.02)
EST. AVERAGE GLUCOSE BLD GHB EST-MCNC: 157.1 MG/DL
GFR SERPLBLD CREATININE-BSD FMLA CKD-EPI: >60 MLS/MIN/1.73/M2
GLOBULIN SER-MCNC: 2.6 GM/DL (ref 2.4–3.5)
GLUCOSE SERPL-MCNC: 58 MG/DL (ref 82–115)
HBA1C MFR BLD: 7.1 %
POTASSIUM SERPL-SCNC: 3.9 MMOL/L (ref 3.5–5.1)
PROT SERPL-MCNC: 6.6 GM/DL (ref 5.8–7.6)
SODIUM SERPL-SCNC: 141 MMOL/L (ref 136–145)

## 2023-08-03 PROCEDURE — 83036 HEMOGLOBIN GLYCOSYLATED A1C: CPT

## 2023-08-03 PROCEDURE — 80053 COMPREHEN METABOLIC PANEL: CPT

## 2023-08-03 PROCEDURE — 36415 COLL VENOUS BLD VENIPUNCTURE: CPT

## 2023-08-07 ENCOUNTER — TELEPHONE (OUTPATIENT)
Dept: INTERNAL MEDICINE | Facility: CLINIC | Age: 73
End: 2023-08-07
Payer: MEDICARE

## 2023-08-07 PROBLEM — Z00.00 MEDICARE ANNUAL WELLNESS VISIT, SUBSEQUENT: Status: RESOLVED | Noted: 2023-05-08 | Resolved: 2023-08-07

## 2023-08-14 ENCOUNTER — OFFICE VISIT (OUTPATIENT)
Dept: INTERNAL MEDICINE | Facility: CLINIC | Age: 73
End: 2023-08-14
Payer: MEDICARE

## 2023-08-14 VITALS
HEART RATE: 89 BPM | SYSTOLIC BLOOD PRESSURE: 126 MMHG | BODY MASS INDEX: 27.4 KG/M2 | DIASTOLIC BLOOD PRESSURE: 84 MMHG | TEMPERATURE: 98 F | RESPIRATION RATE: 18 BRPM | OXYGEN SATURATION: 97 % | WEIGHT: 145 LBS

## 2023-08-14 DIAGNOSIS — E16.2 HYPOGLYCEMIA: Primary | ICD-10-CM

## 2023-08-14 DIAGNOSIS — Z79.4 TYPE 2 DIABETES MELLITUS WITH HYPERGLYCEMIA, WITH LONG-TERM CURRENT USE OF INSULIN: Chronic | ICD-10-CM

## 2023-08-14 DIAGNOSIS — E03.9 HYPOTHYROIDISM, UNSPECIFIED TYPE: Chronic | ICD-10-CM

## 2023-08-14 DIAGNOSIS — E11.65 TYPE 2 DIABETES MELLITUS WITH HYPERGLYCEMIA, WITH LONG-TERM CURRENT USE OF INSULIN: Chronic | ICD-10-CM

## 2023-08-14 PROCEDURE — 99214 OFFICE O/P EST MOD 30 MIN: CPT | Mod: ,,,

## 2023-08-14 PROCEDURE — 99214 PR OFFICE/OUTPT VISIT, EST, LEVL IV, 30-39 MIN: ICD-10-PCS | Mod: ,,,

## 2023-08-14 RX ORDER — GLUCAGON INJECTION, SOLUTION 1 MG/.2ML
1 INJECTION, SOLUTION SUBCUTANEOUS ONCE AS NEEDED
Qty: 1 EACH | Refills: 1 | Status: SHIPPED | OUTPATIENT
Start: 2023-08-14 | End: 2023-08-14

## 2023-08-14 NOTE — ASSESSMENT & PLAN NOTE
-A1c 7.1, improved from previous 7.5   -did have 2 episodes of hypoglycemia within last 3 weeks  -allow permissible hypoglycemia due to age   -encourage nighttime snack prior to sleeping  -initiate glucagon pen once p.r.n. severe hypoglycemic  -currently on Lantus 40 units daily, Jardiance 25 mg, and metformin 1000 mg b.i.d., continue  -continues glucose monitor offered, patient declined

## 2023-08-14 NOTE — PROGRESS NOTES
Patient ID: Alexys Madera is a 73 y.o. female.    Chief Complaint: Follow-up (3 mth follow up pt states she is doing well today )     73-year-old female here today for three-month diabetes and thyroid follow-up.  Medical comorbidities include CAD s/p CABG, PAD, HTN, HLD,DMII,  hypothyroidism, and DILLON.  Since last visit patient reports has been fairly well without any acute injury or major illness.  Most recent HGB A1c 7.1 (August 2023), improved from previous HGB A1c 7.5 (May 2023).  Reports 100% compliance on Jardiance 25 mg, metformin 1000 mg b.i.d., and Lantus 40 units daily.  Has been attempted to leave a healthier lifestyle.  Snacking on carrots and low carb snacks instead of prior unhealthy items.  Does attempt to walk, however having some difficulty due to hot temperatures outside.  Did have some low hypoglycemic episodes in the 40s and 60s range x2 episodes within the last 3 weeks.  Due to this we will hold off on increasing baseline diabetes medicines and allow permissible hyperglycemia.  Encouraged nighttime snack prior to bedtime as well as prescribing Emergency glucagon pen.  Otherwise no other acute medical concerns noted.    Wellness: 05/08/2023        MEDICAL HISTORY:    Past Medical History:   Diagnosis Date    CAD (coronary artery disease) 11/7/2022    Hypothyroidism 11/7/2022    Mixed hyperlipidemia 11/7/2022    DILLON (obstructive sleep apnea) 11/7/2022    PAD (peripheral artery disease) 11/7/2022    Primary hypertension 11/7/2022    Type 2 diabetes mellitus with hyperglycemia, with long-term current use of insulin 11/7/2022      Past Surgical History:   Procedure Laterality Date    CARDIAC SURGERY        Social History     Tobacco Use    Smoking status: Never    Smokeless tobacco: Never   Substance Use Topics    Alcohol use: Not Currently    Drug use: Never          Health Maintenance Due   Topic Date Due    Hepatitis C Screening  Never done    Diabetes Urine Screening  Never done    Foot Exam   Never done    Shingles Vaccine (2 of 3) 12/08/2016    COVID-19 Vaccine (5 - Moderna series) 06/19/2023    Mammogram  09/12/2023    Eye Exam  10/18/2023          Patient Care Team:  Edenilson Redmond MD as PCP - General (Internal Medicine)  Edenilson Redmond MD Cheeran, Bose D, MD as Consulting Physician (Cardiology)      Review of Systems   Constitutional:  Negative for fatigue and fever.   HENT:  Negative for congestion, rhinorrhea, sore throat and trouble swallowing.    Eyes:  Negative for redness and visual disturbance.   Respiratory:  Negative for cough, chest tightness and shortness of breath.    Cardiovascular:  Negative for chest pain and palpitations.   Gastrointestinal:  Negative for abdominal pain, constipation, diarrhea, nausea and vomiting.   Genitourinary:  Negative for dysuria, flank pain, frequency and urgency.   Musculoskeletal:  Negative for arthralgias, gait problem and myalgias.   Skin:  Negative for rash and wound.   Neurological:  Negative for facial asymmetry, speech difficulty, weakness and headaches.   All other systems reviewed and are negative.      Objective:   /84 (BP Location: Left arm, Patient Position: Sitting, BP Method: Small (Automatic))   Pulse 89   Temp 97.5 °F (36.4 °C) (Temporal)   Resp 18   Wt 65.8 kg (145 lb)   SpO2 97%   BMI 27.40 kg/m²      Physical Exam  Constitutional:       General: She is not in acute distress.     Appearance: Normal appearance.   HENT:      Right Ear: Tympanic membrane, ear canal and external ear normal.      Left Ear: Tympanic membrane, ear canal and external ear normal.      Nose: Nose normal.      Mouth/Throat:      Mouth: Mucous membranes are moist.      Pharynx: Oropharynx is clear.   Eyes:      Extraocular Movements: Extraocular movements intact.      Conjunctiva/sclera: Conjunctivae normal.      Pupils: Pupils are equal, round, and reactive to light.   Cardiovascular:      Rate and Rhythm: Normal rate and regular rhythm.       Pulses: Normal pulses.      Heart sounds: Normal heart sounds. No murmur heard.     No gallop.   Pulmonary:      Effort: Pulmonary effort is normal.      Breath sounds: Normal breath sounds. No wheezing.   Abdominal:      General: Bowel sounds are normal. There is no distension.      Palpations: Abdomen is soft. There is no mass.      Tenderness: There is no abdominal tenderness. There is no guarding.   Musculoskeletal:         General: Normal range of motion.   Skin:     General: Skin is warm and dry.   Neurological:      Mental Status: She is alert. Mental status is at baseline.      Sensory: No sensory deficit.      Motor: No weakness.           Assessment:       ICD-10-CM ICD-9-CM   1. Hypoglycemia  E16.2 251.2   2. Type 2 diabetes mellitus with hyperglycemia, with long-term current use of insulin  E11.65 250.00    Z79.4 790.29     V58.67   3. Hypothyroidism, unspecified type  E03.9 244.9        Plan:     Problem List Items Addressed This Visit          Endocrine    Hypothyroidism (Chronic)     -add TSH level for next visit   -currently on levothyroxine 112 mcg daily, continue            Type 2 diabetes mellitus with hyperglycemia, with long-term current use of insulin (Chronic)     -A1c 7.1, improved from previous 7.5   -did have 2 episodes of hypoglycemia within last 3 weeks  -allow permissible hypoglycemia due to age   -encourage nighttime snack prior to sleeping  -initiate glucagon pen once p.r.n. severe hypoglycemic  -currently on Lantus 40 units daily, Jardiance 25 mg, and metformin 1000 mg b.i.d., continue  -continues glucose monitor offered, patient declined         Relevant Medications    glucagon (GVOKE HYPOPEN 2-PACK) 1 mg/0.2 mL AtIn    Hypoglycemia - Primary     -A1c 7.1, improved from previous 7.5   -did have 2 episodes of hypoglycemia within last 3 weeks  -allow permissible hypoglycemia due to age   -encourage nighttime snack prior to sleeping  -initiate glucagon pen once p.r.n. severe  hypoglycemic  -currently on Lantus 40 units daily, Jardiance 25 mg, and metformin 1000 mg b.i.d., continue  -continues glucose monitor offered, patient declined         Relevant Medications    glucagon (GVOKE HYPOPEN 2-PACK) 1 mg/0.2 mL AtIn          Follow up in about 3 months (around 2023) for Diabetes, Thyroid, General Checkup, with Dr. Redmond.   -plan specifics discussed above    Orders Placed This Encounter    glucagon (GVOKE HYPOPEN 2-PACK) 1 mg/0.2 mL AtIn        Medication List with Changes/Refills   New Medications    GLUCAGON (GVOKE HYPOPEN 2-PACK) 1 MG/0.2 ML ATIN    Inject 1 mg into the skin once as needed (For severe hypoglycemia).   Current Medications    AMLODIPINE (NORVASC) 5 MG TABLET    Take 5 mg by mouth once daily.    ASPIRIN (ECOTRIN) 81 MG EC TABLET    Take 81 mg by mouth once daily.    ATORVASTATIN (LIPITOR) 40 MG TABLET    Take 40 mg by mouth once daily.    CLOPIDOGREL (PLAVIX) 75 MG TABLET    Take 75 mg by mouth once daily.    EMPAGLIFLOZIN (JARDIANCE) 25 MG TABLET    Take 1 tablet by mouth once daily.    HYDROCHLOROTHIAZIDE (HYDRODIURIL) 25 MG TABLET    TAKE 1 TABLET BY MOUTH  DAILY    LANTUS SOLOSTAR U-100 INSULIN GLARGINE 100 UNITS/ML SUBQ PEN    Inject 40 Units into the skin once daily.    LEVOTHYROXINE (SYNTHROID) 112 MCG TABLET    TAKE 1 TABLET BY MOUTH  DAILY    LISINOPRIL (PRINIVIL,ZESTRIL) 40 MG TABLET    TAKE 1 TABLET BY MOUTH  DAILY    METFORMIN (GLUCOPHAGE) 1000 MG TABLET    TAKE 1 TABLET BY MOUTH  TWICE DAILY    METOPROLOL TARTRATE (LOPRESSOR) 25 MG TABLET    Take 25 mg by mouth once daily.    REPATHA PUSHTRONEX 420 MG/3.5 ML INJT    SMARTSI SUB-Q Once a Month   Discontinued Medications    AMOXICILLIN (AMOXIL) 500 MG CAPSULE    Take 500 mg by mouth 3 (three) times daily.

## 2023-08-16 DIAGNOSIS — I10 PRIMARY HYPERTENSION: ICD-10-CM

## 2023-08-16 DIAGNOSIS — E11.65 TYPE 2 DIABETES MELLITUS WITH HYPERGLYCEMIA, WITH LONG-TERM CURRENT USE OF INSULIN: Primary | ICD-10-CM

## 2023-08-16 DIAGNOSIS — Z79.4 TYPE 2 DIABETES MELLITUS WITH HYPERGLYCEMIA, WITH LONG-TERM CURRENT USE OF INSULIN: Primary | ICD-10-CM

## 2023-08-16 RX ORDER — LISINOPRIL 40 MG/1
TABLET ORAL
Qty: 90 TABLET | Refills: 3 | Status: SHIPPED | OUTPATIENT
Start: 2023-08-16

## 2023-08-16 RX ORDER — METFORMIN HYDROCHLORIDE 1000 MG/1
TABLET ORAL
Qty: 180 TABLET | Refills: 3 | Status: SHIPPED | OUTPATIENT
Start: 2023-08-16

## 2023-08-16 RX ORDER — HYDROCHLOROTHIAZIDE 25 MG/1
TABLET ORAL
Qty: 90 TABLET | Refills: 3 | Status: SHIPPED | OUTPATIENT
Start: 2023-08-16

## 2023-10-17 DIAGNOSIS — E03.9 HYPOTHYROIDISM, UNSPECIFIED TYPE: ICD-10-CM

## 2023-10-17 RX ORDER — LEVOTHYROXINE SODIUM 112 UG/1
TABLET ORAL
Qty: 90 TABLET | Refills: 3 | Status: SHIPPED | OUTPATIENT
Start: 2023-10-17

## 2023-11-07 ENCOUNTER — TELEPHONE (OUTPATIENT)
Dept: INTERNAL MEDICINE | Facility: CLINIC | Age: 73
End: 2023-11-07
Payer: MEDICARE

## 2023-11-07 NOTE — TELEPHONE ENCOUNTER
----- Message from [place] sent at 11/7/2023 10:12 AM CST -----  Regarding: Paper Work  .Type:  Patient Returning Call    Who Called:Alexys  Who Left Message for Patient:pt  Does the patient know what this is regarding?:paper work  Would the patient rather a call back or a response via [facility]?   Best Call Back Number:[phone]  Additional Information: Please call patient back about her patient assistant paper work on Jardiance

## 2023-11-16 DIAGNOSIS — E11.65 TYPE 2 DIABETES MELLITUS WITH HYPERGLYCEMIA, WITH LONG-TERM CURRENT USE OF INSULIN: Primary | Chronic | ICD-10-CM

## 2023-11-16 DIAGNOSIS — Z79.4 TYPE 2 DIABETES MELLITUS WITH HYPERGLYCEMIA, WITH LONG-TERM CURRENT USE OF INSULIN: Primary | Chronic | ICD-10-CM

## 2023-11-27 DIAGNOSIS — Z79.4 TYPE 2 DIABETES MELLITUS WITH HYPERGLYCEMIA, WITH LONG-TERM CURRENT USE OF INSULIN: ICD-10-CM

## 2023-11-27 DIAGNOSIS — E03.9 HYPOTHYROIDISM, UNSPECIFIED TYPE: Primary | ICD-10-CM

## 2023-11-27 DIAGNOSIS — E11.65 TYPE 2 DIABETES MELLITUS WITH HYPERGLYCEMIA, WITH LONG-TERM CURRENT USE OF INSULIN: ICD-10-CM

## 2023-11-29 LAB — HBA1C MFR BLD: 7 % (ref 4–6)

## 2023-11-30 ENCOUNTER — TELEPHONE (OUTPATIENT)
Dept: INTERNAL MEDICINE | Facility: CLINIC | Age: 73
End: 2023-11-30
Payer: MEDICARE

## 2023-11-30 NOTE — TELEPHONE ENCOUNTER
----- Message from Araseli Wagner sent at 11/30/2023 10:49 AM CST -----  Regarding: results  .Type:  Needs Medical Advice    Who Called: pt  Symptoms (please be specific):    How long has patient had these symptoms:    Pharmacy name and phone #:    Would the patient rather a call back or a response via [facility]? Call back  Best Call Back Number: [phone]  Additional Information: pt is requesting a call back about test results

## 2023-12-04 ENCOUNTER — OFFICE VISIT (OUTPATIENT)
Dept: INTERNAL MEDICINE | Facility: CLINIC | Age: 73
End: 2023-12-04
Payer: MEDICARE

## 2023-12-04 VITALS
WEIGHT: 147.38 LBS | DIASTOLIC BLOOD PRESSURE: 70 MMHG | HEART RATE: 63 BPM | HEIGHT: 61 IN | BODY MASS INDEX: 27.83 KG/M2 | SYSTOLIC BLOOD PRESSURE: 136 MMHG | OXYGEN SATURATION: 98 %

## 2023-12-04 DIAGNOSIS — E03.9 HYPOTHYROIDISM, UNSPECIFIED TYPE: Chronic | ICD-10-CM

## 2023-12-04 DIAGNOSIS — E11.65 TYPE 2 DIABETES MELLITUS WITH HYPERGLYCEMIA, WITH LONG-TERM CURRENT USE OF INSULIN: Primary | Chronic | ICD-10-CM

## 2023-12-04 DIAGNOSIS — Z79.4 TYPE 2 DIABETES MELLITUS WITH HYPERGLYCEMIA, WITH LONG-TERM CURRENT USE OF INSULIN: Primary | Chronic | ICD-10-CM

## 2023-12-04 PROCEDURE — 99213 OFFICE O/P EST LOW 20 MIN: CPT | Mod: ,,, | Performed by: INTERNAL MEDICINE

## 2023-12-04 PROCEDURE — 99213 PR OFFICE/OUTPT VISIT, EST, LEVL III, 20-29 MIN: ICD-10-PCS | Mod: ,,, | Performed by: INTERNAL MEDICINE

## 2023-12-04 RX ORDER — GLUCAGON INJECTION, SOLUTION 1 MG/.2ML
INJECTION, SOLUTION SUBCUTANEOUS 2 TIMES DAILY PRN
COMMUNITY

## 2023-12-04 RX ORDER — FLUTICASONE PROPIONATE 50 MCG
SPRAY, SUSPENSION (ML) NASAL
Status: ON HOLD | COMMUNITY
Start: 2023-10-06 | End: 2024-03-20 | Stop reason: HOSPADM

## 2023-12-04 RX ORDER — NEOMYCIN SULFATE, POLYMYXIN B SULFATE, AND DEXAMETHASONE 3.5; 10000; 1 MG/G; [USP'U]/G; MG/G
OINTMENT OPHTHALMIC 2 TIMES DAILY PRN
Status: ON HOLD | COMMUNITY
Start: 2023-10-19 | End: 2024-03-20 | Stop reason: HOSPADM

## 2023-12-04 RX ORDER — METOPROLOL SUCCINATE 25 MG/1
12.5 TABLET, EXTENDED RELEASE ORAL 2 TIMES DAILY
COMMUNITY

## 2023-12-04 NOTE — ASSESSMENT & PLAN NOTE
A1c levels doing very well   Due to hypoglycemia decrease Lantus to 35 units daily.  Revisit six-month annual wellness

## 2023-12-04 NOTE — PROGRESS NOTES
Edenilson Gallego MD        PATIENT NAME: Alexys Madera  : 1950  DATE: 23  MRN: [id number]      Billing Provider: Edenilson Gallego MD  Level of Service: [place] OFFICE/OUTPT VISIT, EST, LEVL III, 20-29 MIN  Patient PCP Information       Provider PCP Type    Edenilson Gallego MD General            Reason for Visit / Chief Complaint: Follow-up (3 mth f/u DM, thyroid/)       Update PCP  Update Chief Complaint         History of Present Illness / Problem Focused Workflow     Alexys Madera presents to the clinic with Follow-up (3 mth f/u DM, thyroid/)     Patient is here for follow-up of diabetes   She is had quite a few hypoglycemic events in the evening some in the 40 and 50 range           Review of Systems   Review of Systems   Constitutional: Negative.    HENT: Negative.     Eyes: Negative.    Respiratory: Negative.     Cardiovascular: Negative.    Gastrointestinal: Negative.    Endocrine: Negative.    Genitourinary: Negative.    Musculoskeletal: Negative.    Integumentary:  Negative.   Neurological: Negative.    Psychiatric/Behavioral: Negative.          Medical / Social / Family History     Past Medical History:   Diagnosis Date    CAD (coronary artery disease) 2022    Hypothyroidism 2022    Mixed hyperlipidemia 2022    DILLON (obstructive sleep apnea) 2022    PAD (peripheral artery disease) 2022    Primary hypertension 2022    Type 2 diabetes mellitus with hyperglycemia, with long-term current use of insulin 2022       Past Surgical History:   Procedure Laterality Date    CARDIAC SURGERY         Social History  Ms. Madera  reports that she has never smoked. She has never used smokeless tobacco. She reports that she does not currently use alcohol. She reports that she does not use drugs.    Family History  Ms.'s Madera  family history is not on file.    Medications and Allergies     Medications  Outpatient Medications Marked as Taking for the 23 encounter  (Office Visit) with Edenilson Redmond MD   Medication Sig Dispense Refill    amLODIPine (NORVASC) 5 MG tablet Take 5 mg by mouth once daily.      aspirin (ECOTRIN) 81 MG EC tablet Take 81 mg by mouth once daily.      atorvastatin (LIPITOR) 40 MG tablet Take 40 mg by mouth once daily.      clopidogreL (PLAVIX) 75 mg tablet Take 75 mg by mouth once daily.      empagliflozin (JARDIANCE) 25 mg tablet Take 1 tablet (25 mg total) by mouth once daily. 30 tablet 11    fluticasone propionate (FLONASE) 50 mcg/actuation nasal spray by Each Nostril route.      GVOKE HYPOPEN 1-PACK 1 mg/0.2 mL AtIn       hydroCHLOROthiazide (HYDRODIURIL) 25 MG tablet TAKE 1 TABLET BY MOUTH  DAILY 90 tablet 3    LANTUS SOLOSTAR U-100 INSULIN glargine 100 units/mL SubQ pen Inject 40 Units into the skin once daily. 3 mL 3    levothyroxine (SYNTHROID) 112 MCG tablet TAKE 1 TABLET BY MOUTH  DAILY 90 tablet 3    lisinopriL (PRINIVIL,ZESTRIL) 40 MG tablet TAKE 1 TABLET BY MOUTH  DAILY 90 tablet 3    metFORMIN (GLUCOPHAGE) 1000 MG tablet TAKE 1 TABLET BY MOUTH  TWICE DAILY 180 tablet 3    metoprolol succinate (TOPROL-XL) 25 MG 24 hr tablet Take 1 tablet by mouth once daily.      neomycin-polymyxin-dexamethasone (DEXACINE) 3.5 mg/g-10,000 unit/g-0.1 % Oint       REPATHA PUSHTRONEX 420 mg/3.5 mL Injt SMARTSI SUB-Q Once a Month      [DISCONTINUED] metoprolol tartrate (LOPRESSOR) 25 MG tablet Take 25 mg by mouth once daily.         Allergies  Review of patient's allergies indicates:  No Known Allergies    Physical Examination     Vitals:    23 0813   BP: 136/70   Pulse: 63     Physical Exam  Constitutional:       Appearance: Normal appearance.   HENT:      Head: Normocephalic and atraumatic.      Right Ear: Tympanic membrane normal.      Left Ear: Tympanic membrane normal.      Nose: Nose normal.      Mouth/Throat:      Mouth: Mucous membranes are moist.   Eyes:      Extraocular Movements: Extraocular movements intact.      Pupils: Pupils are  equal, round, and reactive to light.   Cardiovascular:      Rate and Rhythm: Normal rate and regular rhythm.      Pulses: Normal pulses.   Pulmonary:      Effort: Pulmonary effort is normal.      Breath sounds: Normal breath sounds.   Abdominal:      General: Abdomen is flat. Bowel sounds are normal.      Palpations: Abdomen is soft.   Musculoskeletal:         General: Normal range of motion.      Cervical back: Normal range of motion and neck supple.   Feet:      Right foot:      Protective Sensation: 2 sites tested.  2 sites sensed.      Skin integrity: Skin integrity normal.      Left foot:      Protective Sensation: 2 sites tested.  2 sites sensed.      Skin integrity: Skin integrity normal.   Skin:     General: Skin is warm and dry.   Neurological:      General: No focal deficit present.      Mental Status: She is alert and oriented to person, place, and time.   Psychiatric:         Mood and Affect: Mood normal.         Behavior: Behavior normal.          Assessment and Plan (including Health Maintenance)      Problem List  Smart Sets  Document Outside HM   :    Plan:    ICD-10-CM ICD-9-CM   1. Type 2 diabetes mellitus with hyperglycemia, with long-term current use of insulin  E11.65 250.00    Z79.4 790.29     V58.67   2. Hypothyroidism, unspecified type  E03.9 244.9       Problem List Items Addressed This Visit          Endocrine    Hypothyroidism (Chronic)     Level stable continue medication         Type 2 diabetes mellitus with hyperglycemia, with long-term current use of insulin - Primary (Chronic)     A1c levels doing very well   Due to hypoglycemia decrease Lantus to 35 units daily.  Revisit six-month annual wellness                   Health Maintenance Due   Topic Date Due    Hepatitis C Screening  Never done    Diabetes Urine Screening  Never done    Foot Exam  Never done    RSV Vaccine (Age 60+ and Pregnant patients) (1 - 1-dose 60+ series) Never done    Shingles Vaccine (2 of 3) 12/08/2016     COVID-19 Vaccine (5 - 2023-24 season) 09/01/2023    Mammogram  09/12/2023       Problem List Items Addressed This Visit          Endocrine    Hypothyroidism (Chronic)    Current Assessment & Plan     Level stable continue medication         Type 2 diabetes mellitus with hyperglycemia, with long-term current use of insulin - Primary (Chronic)    Current Assessment & Plan     A1c levels doing very well   Due to hypoglycemia decrease Lantus to 35 units daily.  Revisit six-month annual wellness            Health Maintenance Topics with due status: Not Due       Topic Last Completion Date    TETANUS VACCINE 08/21/2018    DEXA Scan 09/03/2020    Colorectal Cancer Screening 10/11/2021    Lipid Panel 05/03/2023    Hemoglobin A1c 08/03/2023    High Dose Statin 08/14/2023    Aspirin/Antiplatelet Therapy 08/14/2023    Eye Exam 10/19/2023       Future Appointments   Date Time Provider Department Center   12/4/2023  9:00 AM Edenilson Redmond MD [facility] [place] [id number]   6/24/2024  1:20 PM Edenilson Redmond MD [facility] [place] [id number]            Signature:  Edenilson Redmond MD  [facility]  [address] [facility] [id number]    Date of encounter: 12/4/23

## 2024-02-10 DIAGNOSIS — E11.9 TYPE 2 DIABETES MELLITUS WITHOUT COMPLICATION, WITH LONG-TERM CURRENT USE OF INSULIN: ICD-10-CM

## 2024-02-10 DIAGNOSIS — Z79.4 TYPE 2 DIABETES MELLITUS WITHOUT COMPLICATION, WITH LONG-TERM CURRENT USE OF INSULIN: ICD-10-CM

## 2024-02-10 RX ORDER — INSULIN GLARGINE 100 [IU]/ML
INJECTION, SOLUTION SUBCUTANEOUS
Qty: 15 ML | Refills: 8 | Status: SHIPPED | OUTPATIENT
Start: 2024-02-10

## 2024-03-01 DIAGNOSIS — T82.898A: Primary | ICD-10-CM

## 2024-03-13 ENCOUNTER — HOSPITAL ENCOUNTER (OUTPATIENT)
Dept: RADIOLOGY | Facility: HOSPITAL | Age: 74
Discharge: HOME | End: 2024-03-13
Attending: THORACIC SURGERY (CARDIOTHORACIC VASCULAR SURGERY)
Payer: MEDICARE

## 2024-03-13 ENCOUNTER — OFFICE VISIT (OUTPATIENT)
Dept: CARDIAC SURGERY | Facility: CLINIC | Age: 74
End: 2024-03-13
Payer: MEDICARE

## 2024-03-13 VITALS
SYSTOLIC BLOOD PRESSURE: 154 MMHG | WEIGHT: 148.19 LBS | OXYGEN SATURATION: 97 % | HEART RATE: 69 BPM | DIASTOLIC BLOOD PRESSURE: 76 MMHG | HEIGHT: 60 IN | BODY MASS INDEX: 29.09 KG/M2

## 2024-03-13 DIAGNOSIS — Z79.01 ADMISSION FOR LONG-TERM (CURRENT) USE OF ANTICOAGULANTS: ICD-10-CM

## 2024-03-13 DIAGNOSIS — T82.898A: ICD-10-CM

## 2024-03-13 DIAGNOSIS — I73.9 PAD (PERIPHERAL ARTERY DISEASE): Primary | ICD-10-CM

## 2024-03-13 DIAGNOSIS — Z51.81 ADMISSION FOR LONG-TERM (CURRENT) USE OF ANTICOAGULANTS: ICD-10-CM

## 2024-03-13 DIAGNOSIS — I73.9 PAD (PERIPHERAL ARTERY DISEASE): ICD-10-CM

## 2024-03-13 PROCEDURE — 71046 X-RAY EXAM CHEST 2 VIEWS: CPT | Mod: TC

## 2024-03-13 PROCEDURE — 99214 OFFICE O/P EST MOD 30 MIN: CPT | Mod: ,,, | Performed by: THORACIC SURGERY (CARDIOTHORACIC VASCULAR SURGERY)

## 2024-03-13 RX ORDER — ACETAMINOPHEN 500 MG
2000 TABLET ORAL DAILY
COMMUNITY

## 2024-03-13 NOTE — PROGRESS NOTES
History & Physical    SUBJECTIVE:     History of Present Illness:  The patient is presenting for evaluation of severe left lower extremity claudication.  Workup with Dr. Bueno revealed evidence of a common femoral artery disease and popliteal artery disease.  Surgical intervention      Chief Complaint   Patient presents with    Pre-op Exam     REFERRAL-DR. GOLDSTEIN-MAYITO LT CFA ENDART/LT FEM POP OF ANTERIAL TIBIAL BYPASS. DX: DEVERE PAD, SEVERE LT CFA CLAUDICATION & STENOSIS LT % OCCLUDED W/ SINGLE VESSEL RUNOFF & FROM POP, JUAN CLASS 3 CLAUDICATION. PMH:   CAD, S/P CABG X 3, S/P PTC, HTN, DM2, DILLON-CPAP          Review of patient's allergies indicates:  No Known Allergies    Current Outpatient Medications   Medication Sig Dispense Refill    amLODIPine (NORVASC) 5 MG tablet Take 5 mg by mouth once daily.      aspirin (ECOTRIN) 81 MG EC tablet Take 81 mg by mouth once daily.      atorvastatin (LIPITOR) 40 MG tablet Take 40 mg by mouth once daily.      cholecalciferol, vitamin D3, (VITAMIN D3) 50 mcg (2,000 unit) Cap capsule Take by mouth once daily.      clopidogreL (PLAVIX) 75 mg tablet Take 75 mg by mouth once daily.      empagliflozin (JARDIANCE) 25 mg tablet Take 1 tablet (25 mg total) by mouth once daily. 30 tablet 11    hydroCHLOROthiazide (HYDRODIURIL) 25 MG tablet TAKE 1 TABLET BY MOUTH  DAILY 90 tablet 3    LANTUS SOLOSTAR U-100 INSULIN glargine 100 units/mL SubQ pen INJECT SUBCUTANEOUSLY 40 UNITS  ONCE DAILY 15 mL 8    levothyroxine (SYNTHROID) 112 MCG tablet TAKE 1 TABLET BY MOUTH  DAILY 90 tablet 3    lisinopriL (PRINIVIL,ZESTRIL) 40 MG tablet TAKE 1 TABLET BY MOUTH  DAILY 90 tablet 3    metFORMIN (GLUCOPHAGE) 1000 MG tablet TAKE 1 TABLET BY MOUTH  TWICE DAILY 180 tablet 3    metoprolol succinate (TOPROL-XL) 25 MG 24 hr tablet Take 1 tablet by mouth once daily.      REPATHA PUSHTRONEX 420 mg/3.5 mL Injt SMARTSI SUB-Q Once a Month      fluticasone propionate (FLONASE) 50 mcg/actuation nasal  spray by Each Nostril route.      GVOKE HYPOPEN 1-PACK 1 mg/0.2 mL AtIn       neomycin-polymyxin-dexamethasone (DEXACINE) 3.5 mg/g-10,000 unit/g-0.1 % Oint        No current facility-administered medications for this visit.       Past Medical History:   Diagnosis Date    CAD (coronary artery disease) 11/7/2022    Hypothyroidism 11/7/2022    Mixed hyperlipidemia 11/7/2022    DILLON (obstructive sleep apnea) 11/7/2022    PAD (peripheral artery disease) 11/7/2022    Primary hypertension 11/7/2022    Type 2 diabetes mellitus with hyperglycemia, with long-term current use of insulin 11/7/2022     Past Surgical History:   Procedure Laterality Date    CARDIAC SURGERY       History reviewed. No pertinent family history.  Social History     Tobacco Use    Smoking status: Never    Smokeless tobacco: Never   Substance Use Topics    Alcohol use: Not Currently    Drug use: Never        Review of Systems:  Review of Systems   Constitutional: Negative.    HENT: Negative.     Eyes: Negative.    Respiratory: Negative.     Cardiovascular: Negative.    Gastrointestinal: Negative.    Endocrine: Negative.    Genitourinary: Negative.    Musculoskeletal:  Positive for myalgias.        Claudications   Skin: Negative.    Allergic/Immunologic: Negative.    Neurological: Negative.    Hematological: Negative.    Psychiatric/Behavioral: Negative.         OBJECTIVE:     Vital Signs (Most Recent)  Pulse: 69 (03/13/24 0938)  BP: (!) 154/76 (03/13/24 0938)  SpO2: 97 % (03/13/24 0933)  5' (1.524 m)  67.2 kg (148 lb 3.2 oz)     Physical Exam:  Physical Exam  Vitals reviewed.   Constitutional:       Appearance: Normal appearance.   HENT:      Head: Normocephalic and atraumatic.      Nose: Nose normal.      Mouth/Throat:      Mouth: Mucous membranes are dry.      Pharynx: Oropharynx is clear.   Eyes:      Extraocular Movements: Extraocular movements intact.      Conjunctiva/sclera: Conjunctivae normal.      Pupils: Pupils are equal, round, and reactive to  light.   Cardiovascular:      Rate and Rhythm: Normal rate and regular rhythm.      Pulses: Normal pulses.   Pulmonary:      Effort: Pulmonary effort is normal.      Breath sounds: Normal breath sounds.   Abdominal:      General: Abdomen is flat.      Palpations: Abdomen is soft.   Musculoskeletal:         General: Normal range of motion.      Cervical back: Neck supple.   Skin:     General: Skin is warm and dry.   Neurological:      General: No focal deficit present.   Psychiatric:         Mood and Affect: Mood normal.         Laboratory:  None      Diagnostic Results:  Angiogram reviewed      ASSESSMENT/PLAN:     Severe claudication with left common femoral artery disease and popliteal artery disease.  1 vessel runoff below-the-knee.  I believe the patient will benefit initially from a left common femoral artery endarterectomy.  We will evaluate her symptoms after.  If she remains symptomatic she might benefit from SFA to anterior tibial bypass at a different date.

## 2024-03-18 ENCOUNTER — ANESTHESIA EVENT (OUTPATIENT)
Dept: SURGERY | Facility: HOSPITAL | Age: 74
DRG: 254 | End: 2024-03-18
Payer: MEDICARE

## 2024-03-18 NOTE — PRE-PROCEDURE INSTRUCTIONS
"[facility]: Outpatient Surgery  Preprocedure Instructions    Expectations: "Because of inconsistent procedure completion times, an unexpected wait may occur. The physicians would like you to be here to prepare in the event they run ahead of time. We will make you as comfortable as possible and keep you informed. We apologize in advance if this happens."     Your physician will be calling you with your arrival time.   We ask patients to arrive about 2 hours before surgery to allow for enough time to review your health history & medications, start your IV, complete any outstanding labwork or tests, and meet your Anesthesiologist.    We are located at [facility], [address] [place] [address].    Enter through the [place] entrance next to the Emergency Room, and come to the 6th floor to the Outpatient Surgery Department.    If you are in need of a wheelchair the morning of surgery please call [phone] about 15 minutes before you arrive. Parking is available in our parking garage located off [facility], between the hospital and [facility].      Visitory Policy:  You are allowed 2 adult visitors to be with you in the hospital. Please, no switching visitors in pre-op area. All hospital visitors should be in good current health.  No small children.  We will update you and your family hourly on the progression of surgery and any unexpected delays.      What to Bring:  Please have your ID, insurance cards, and all home medication bottles with you at check in.  Bring your CPAP machine if one is used at home.     Fasting:  Nothing to eat or drink after midnight the night before your procedure. This includes no ice, gum, hard candies, and/or tobacco products.    Medications:  Follow your doctor's instructions for taking any medications on the morning of your procedure.  If no instructions for taking medications were given, do not take any medications but bring your " medications in their bottles to your procedure check in.     Follow your doctor's preoperative instructions regarding skin prep, bowel prep, bathing, or showering prior to your procedure.  If any special soaps were provided to you, please use according to your doctor's instructions. If no instructions were given from your doctor, take a good bath or shower with antibacterial soap the night before and the morning of your procedure.  On the morning of procedure, wear loose, comfortable clothing.  No lotions, makeup, perfumes, colognes, deodorant, or jewelry to your procedure.  Removable items (glasses, contact lenses, dentures, retainers, hearing aids) need to be removed for your procedure.  Bring your storage containers for these items if you wear them.     Artificial nails, body jewelry, eyelash extensions, and/or hair extensions with metal clips are not allowed during your surgery.  If you currently wear any of these items, please arrange for them to be removed prior to your arrival to the hospital.     Outpatient or Same Day Surgeries:  Any patients receiving sedation/anesthesia are advised not to drive for 24 hours after their procedure.  We do not allow patients to drive themselves home after discharge.  If you are going home after your procedure, please have someone available to drive you home from the hospital.     You may call the Outpatient Surgery Department at [phone] with any questions or concerns.  We are looking forward to meeting you and taking great care of you for your procedure.  Thank you for choosing [facility] for your surgical needs.

## 2024-03-19 ENCOUNTER — ANESTHESIA (OUTPATIENT)
Dept: SURGERY | Facility: HOSPITAL | Age: 74
DRG: 254 | End: 2024-03-19
Payer: MEDICARE

## 2024-03-19 ENCOUNTER — HOSPITAL ENCOUNTER (INPATIENT)
Facility: HOSPITAL | Age: 74
LOS: 1 days | Discharge: HOME | DRG: 254 | End: 2024-03-20
Attending: THORACIC SURGERY (CARDIOTHORACIC VASCULAR SURGERY) | Admitting: THORACIC SURGERY (CARDIOTHORACIC VASCULAR SURGERY)
Payer: MEDICARE

## 2024-03-19 DIAGNOSIS — I73.9 PAD (PERIPHERAL ARTERY DISEASE): ICD-10-CM

## 2024-03-19 LAB
POCT GLUCOSE: 125 MG/DL (ref 70–110)
POCT GLUCOSE: 153 MG/DL (ref 70–110)

## 2024-03-19 PROCEDURE — 04UL0KZ SUPPLEMENT LEFT FEMORAL ARTERY WITH NONAUTOLOGOUS TISSUE SUBSTITUTE, OPEN APPROACH: ICD-10-PCS | Performed by: THORACIC SURGERY (CARDIOTHORACIC VASCULAR SURGERY)

## 2024-03-19 PROCEDURE — 88311 DECALCIFY TISSUE: CPT

## 2024-03-19 PROCEDURE — 36000706: Performed by: THORACIC SURGERY (CARDIOTHORACIC VASCULAR SURGERY)

## 2024-03-19 PROCEDURE — D9220A PRA ANESTHESIA: Mod: ANES,,, | Performed by: ANESTHESIOLOGY

## 2024-03-19 PROCEDURE — 04CL0ZZ EXTIRPATION OF MATTER FROM LEFT FEMORAL ARTERY, OPEN APPROACH: ICD-10-PCS | Performed by: THORACIC SURGERY (CARDIOTHORACIC VASCULAR SURGERY)

## 2024-03-19 PROCEDURE — 71000033 HC RECOVERY, INTIAL HOUR: Performed by: THORACIC SURGERY (CARDIOTHORACIC VASCULAR SURGERY)

## 2024-03-19 PROCEDURE — D9220A PRA ANESTHESIA: Mod: CRNA,,, | Performed by: NURSE ANESTHETIST, CERTIFIED REGISTERED

## 2024-03-19 PROCEDURE — C1768 GRAFT, VASCULAR: HCPCS | Performed by: THORACIC SURGERY (CARDIOTHORACIC VASCULAR SURGERY)

## 2024-03-19 PROCEDURE — 82962 GLUCOSE BLOOD TEST: CPT | Performed by: THORACIC SURGERY (CARDIOTHORACIC VASCULAR SURGERY)

## 2024-03-19 PROCEDURE — 37000009 HC ANESTHESIA EA ADD 15 MINS: Performed by: THORACIC SURGERY (CARDIOTHORACIC VASCULAR SURGERY)

## 2024-03-19 PROCEDURE — 25000003 PHARM REV CODE 250: Performed by: THORACIC SURGERY (CARDIOTHORACIC VASCULAR SURGERY)

## 2024-03-19 PROCEDURE — 37000008 HC ANESTHESIA 1ST 15 MINUTES: Performed by: THORACIC SURGERY (CARDIOTHORACIC VASCULAR SURGERY)

## 2024-03-19 PROCEDURE — 35372 RECHANNELING OF ARTERY: CPT | Mod: LT,,, | Performed by: THORACIC SURGERY (CARDIOTHORACIC VASCULAR SURGERY)

## 2024-03-19 PROCEDURE — 88304 TISSUE EXAM BY PATHOLOGIST: CPT | Performed by: THORACIC SURGERY (CARDIOTHORACIC VASCULAR SURGERY)

## 2024-03-19 PROCEDURE — 25000003 PHARM REV CODE 250: Performed by: NURSE ANESTHETIST, CERTIFIED REGISTERED

## 2024-03-19 PROCEDURE — 25000003 PHARM REV CODE 250: Performed by: PHYSICIAN ASSISTANT

## 2024-03-19 PROCEDURE — 71000015 HC POSTOP RECOV 1ST HR: Performed by: THORACIC SURGERY (CARDIOTHORACIC VASCULAR SURGERY)

## 2024-03-19 PROCEDURE — 35372 RECHANNELING OF ARTERY: CPT | Mod: AS,LT,,

## 2024-03-19 PROCEDURE — 63600175 PHARM REV CODE 636 W HCPCS: Performed by: ANESTHESIOLOGY

## 2024-03-19 PROCEDURE — 63600175 PHARM REV CODE 636 W HCPCS: Performed by: THORACIC SURGERY (CARDIOTHORACIC VASCULAR SURGERY)

## 2024-03-19 PROCEDURE — 36000707: Performed by: THORACIC SURGERY (CARDIOTHORACIC VASCULAR SURGERY)

## 2024-03-19 PROCEDURE — 63600175 PHARM REV CODE 636 W HCPCS: Performed by: NURSE ANESTHETIST, CERTIFIED REGISTERED

## 2024-03-19 PROCEDURE — 71000039 HC RECOVERY, EACH ADD'L HOUR: Performed by: THORACIC SURGERY (CARDIOTHORACIC VASCULAR SURGERY)

## 2024-03-19 PROCEDURE — 11000001 HC ACUTE MED/SURG PRIVATE ROOM

## 2024-03-19 PROCEDURE — 27201423 OPTIME MED/SURG SUP & DEVICES STERILE SUPPLY: Performed by: THORACIC SURGERY (CARDIOTHORACIC VASCULAR SURGERY)

## 2024-03-19 DEVICE — PATCH XENOSURE TAPR .8X8CM: Type: IMPLANTABLE DEVICE | Site: GROIN | Status: FUNCTIONAL

## 2024-03-19 RX ORDER — FENTANYL CITRATE 50 UG/ML
INJECTION, SOLUTION INTRAMUSCULAR; INTRAVENOUS
Status: DISCONTINUED | OUTPATIENT
Start: 2024-03-19 | End: 2024-03-19

## 2024-03-19 RX ORDER — PROPOFOL 10 MG/ML
VIAL (ML) INTRAVENOUS
Status: DISCONTINUED | OUTPATIENT
Start: 2024-03-19 | End: 2024-03-19

## 2024-03-19 RX ORDER — CHOLECALCIFEROL (VITAMIN D3) 10 MCG
2000 TABLET ORAL DAILY
Status: DISCONTINUED | OUTPATIENT
Start: 2024-03-19 | End: 2024-03-20 | Stop reason: HOSPADM

## 2024-03-19 RX ORDER — HYDRALAZINE HYDROCHLORIDE 20 MG/ML
5 INJECTION INTRAMUSCULAR; INTRAVENOUS
Status: DISCONTINUED | OUTPATIENT
Start: 2024-03-19 | End: 2024-03-20 | Stop reason: HOSPADM

## 2024-03-19 RX ORDER — ROCURONIUM BROMIDE 10 MG/ML
INJECTION, SOLUTION INTRAVENOUS
Status: DISCONTINUED | OUTPATIENT
Start: 2024-03-19 | End: 2024-03-19

## 2024-03-19 RX ORDER — SODIUM CHLORIDE 0.9 % (FLUSH) 0.9 %
10 SYRINGE (ML) INJECTION
Status: DISCONTINUED | OUTPATIENT
Start: 2024-03-19 | End: 2024-03-19 | Stop reason: HOSPADM

## 2024-03-19 RX ORDER — PHENYLEPHRINE HYDROCHLORIDE 10 MG/ML
INJECTION INTRAVENOUS
Status: DISCONTINUED | OUTPATIENT
Start: 2024-03-19 | End: 2024-03-19

## 2024-03-19 RX ORDER — ACETAMINOPHEN 325 MG/1
650 TABLET ORAL EVERY 6 HOURS PRN
Status: DISCONTINUED | OUTPATIENT
Start: 2024-03-19 | End: 2024-03-20 | Stop reason: HOSPADM

## 2024-03-19 RX ORDER — HYDROMORPHONE HYDROCHLORIDE 2 MG/ML
0.4 INJECTION, SOLUTION INTRAMUSCULAR; INTRAVENOUS; SUBCUTANEOUS EVERY 5 MIN PRN
Status: DISCONTINUED | OUTPATIENT
Start: 2024-03-19 | End: 2024-03-19 | Stop reason: HOSPADM

## 2024-03-19 RX ORDER — ACETAMINOPHEN 10 MG/ML
INJECTION, SOLUTION INTRAVENOUS
Status: DISCONTINUED | OUTPATIENT
Start: 2024-03-19 | End: 2024-03-19

## 2024-03-19 RX ORDER — ONDANSETRON HYDROCHLORIDE 2 MG/ML
4 INJECTION, SOLUTION INTRAVENOUS EVERY 12 HOURS PRN
Status: DISCONTINUED | OUTPATIENT
Start: 2024-03-19 | End: 2024-03-20 | Stop reason: HOSPADM

## 2024-03-19 RX ORDER — HEPARIN SODIUM 5000 [USP'U]/ML
INJECTION, SOLUTION INTRAVENOUS; SUBCUTANEOUS
Status: DISCONTINUED | OUTPATIENT
Start: 2024-03-19 | End: 2024-03-19 | Stop reason: HOSPADM

## 2024-03-19 RX ORDER — DEXAMETHASONE SODIUM PHOSPHATE 4 MG/ML
INJECTION, SOLUTION INTRA-ARTICULAR; INTRALESIONAL; INTRAMUSCULAR; INTRAVENOUS; SOFT TISSUE
Status: DISCONTINUED | OUTPATIENT
Start: 2024-03-19 | End: 2024-03-19

## 2024-03-19 RX ORDER — ONDANSETRON HYDROCHLORIDE 2 MG/ML
INJECTION, SOLUTION INTRAVENOUS
Status: DISCONTINUED | OUTPATIENT
Start: 2024-03-19 | End: 2024-03-19

## 2024-03-19 RX ORDER — MUPIROCIN 20 MG/G
OINTMENT TOPICAL 2 TIMES DAILY
Status: DISCONTINUED | OUTPATIENT
Start: 2024-03-19 | End: 2024-03-20 | Stop reason: HOSPADM

## 2024-03-19 RX ORDER — HEPARIN SODIUM 1000 [USP'U]/ML
INJECTION, SOLUTION INTRAVENOUS; SUBCUTANEOUS
Status: DISCONTINUED | OUTPATIENT
Start: 2024-03-19 | End: 2024-03-19

## 2024-03-19 RX ORDER — HYDRALAZINE HYDROCHLORIDE 20 MG/ML
10 INJECTION INTRAMUSCULAR; INTRAVENOUS
Status: DISCONTINUED | OUTPATIENT
Start: 2024-03-19 | End: 2024-03-19 | Stop reason: HOSPADM

## 2024-03-19 RX ORDER — HYDROCHLOROTHIAZIDE 25 MG/1
25 TABLET ORAL DAILY
Status: DISCONTINUED | OUTPATIENT
Start: 2024-03-20 | End: 2024-03-20 | Stop reason: HOSPADM

## 2024-03-19 RX ORDER — LEVOTHYROXINE SODIUM 112 UG/1
112 TABLET ORAL DAILY
Status: DISCONTINUED | OUTPATIENT
Start: 2024-03-20 | End: 2024-03-20 | Stop reason: HOSPADM

## 2024-03-19 RX ORDER — LIDOCAINE HYDROCHLORIDE 20 MG/ML
INJECTION, SOLUTION EPIDURAL; INFILTRATION; INTRACAUDAL; PERINEURAL
Status: DISCONTINUED | OUTPATIENT
Start: 2024-03-19 | End: 2024-03-19

## 2024-03-19 RX ORDER — PROTAMINE SULFATE 10 MG/ML
INJECTION, SOLUTION INTRAVENOUS
Status: DISCONTINUED | OUTPATIENT
Start: 2024-03-19 | End: 2024-03-19

## 2024-03-19 RX ORDER — HYDROCODONE BITARTRATE AND ACETAMINOPHEN 5; 325 MG/1; MG/1
1 TABLET ORAL EVERY 4 HOURS PRN
Status: DISCONTINUED | OUTPATIENT
Start: 2024-03-19 | End: 2024-03-20 | Stop reason: HOSPADM

## 2024-03-19 RX ORDER — PROCHLORPERAZINE EDISYLATE 5 MG/ML
5 INJECTION INTRAMUSCULAR; INTRAVENOUS EVERY 6 HOURS PRN
Status: DISCONTINUED | OUTPATIENT
Start: 2024-03-19 | End: 2024-03-20 | Stop reason: HOSPADM

## 2024-03-19 RX ORDER — GLYCOPYRROLATE 0.2 MG/ML
INJECTION INTRAMUSCULAR; INTRAVENOUS
Status: DISCONTINUED | OUTPATIENT
Start: 2024-03-19 | End: 2024-03-19

## 2024-03-19 RX ORDER — ASPIRIN 81 MG/1
81 TABLET ORAL DAILY
Status: DISCONTINUED | OUTPATIENT
Start: 2024-03-19 | End: 2024-03-19

## 2024-03-19 RX ORDER — ATORVASTATIN CALCIUM 40 MG/1
40 TABLET, FILM COATED ORAL DAILY
Status: DISCONTINUED | OUTPATIENT
Start: 2024-03-19 | End: 2024-03-20 | Stop reason: HOSPADM

## 2024-03-19 RX ORDER — AMLODIPINE BESYLATE 5 MG/1
5 TABLET ORAL DAILY
Status: DISCONTINUED | OUTPATIENT
Start: 2024-03-19 | End: 2024-03-20 | Stop reason: HOSPADM

## 2024-03-19 RX ORDER — LISINOPRIL 20 MG/1
40 TABLET ORAL DAILY
Status: DISCONTINUED | OUTPATIENT
Start: 2024-03-20 | End: 2024-03-20 | Stop reason: HOSPADM

## 2024-03-19 RX ORDER — LOPERAMIDE HYDROCHLORIDE 2 MG/1
4 CAPSULE ORAL ONCE
Status: DISCONTINUED | OUTPATIENT
Start: 2024-03-19 | End: 2024-03-20 | Stop reason: HOSPADM

## 2024-03-19 RX ORDER — METFORMIN HYDROCHLORIDE 500 MG/1
1000 TABLET ORAL 2 TIMES DAILY
Status: DISCONTINUED | OUTPATIENT
Start: 2024-03-19 | End: 2024-03-20 | Stop reason: HOSPADM

## 2024-03-19 RX ORDER — DOCUSATE SODIUM 100 MG/1
100 CAPSULE, LIQUID FILLED ORAL 2 TIMES DAILY
Status: DISCONTINUED | OUTPATIENT
Start: 2024-03-19 | End: 2024-03-20 | Stop reason: HOSPADM

## 2024-03-19 RX ORDER — MIDAZOLAM HYDROCHLORIDE 1 MG/ML
INJECTION INTRAMUSCULAR; INTRAVENOUS
Status: DISCONTINUED | OUTPATIENT
Start: 2024-03-19 | End: 2024-03-19

## 2024-03-19 RX ORDER — CLOPIDOGREL BISULFATE 75 MG/1
75 TABLET ORAL DAILY
Status: DISCONTINUED | OUTPATIENT
Start: 2024-03-19 | End: 2024-03-20 | Stop reason: HOSPADM

## 2024-03-19 RX ORDER — ASPIRIN 81 MG/1
81 TABLET ORAL DAILY
Status: DISCONTINUED | OUTPATIENT
Start: 2024-03-19 | End: 2024-03-20 | Stop reason: HOSPADM

## 2024-03-19 RX ADMIN — FENTANYL CITRATE 25 MCG: 50 INJECTION, SOLUTION INTRAMUSCULAR; INTRAVENOUS at 11:03

## 2024-03-19 RX ADMIN — CLOPIDOGREL BISULFATE 75 MG: 75 TABLET ORAL at 02:03

## 2024-03-19 RX ADMIN — ASPIRIN 81 MG: 81 TABLET, COATED ORAL at 02:03

## 2024-03-19 RX ADMIN — PHENYLEPHRINE HYDROCHLORIDE 50 MCG: 10 INJECTION INTRAVENOUS at 11:03

## 2024-03-19 RX ADMIN — ROCURONIUM BROMIDE 50 MG: 10 SOLUTION INTRAVENOUS at 09:03

## 2024-03-19 RX ADMIN — ATORVASTATIN CALCIUM 40 MG: 40 TABLET, FILM COATED ORAL at 08:03

## 2024-03-19 RX ADMIN — PHENYLEPHRINE HYDROCHLORIDE 50 MCG: 10 INJECTION INTRAVENOUS at 09:03

## 2024-03-19 RX ADMIN — HYDROCODONE BITARTRATE AND ACETAMINOPHEN 1 TABLET: 5; 325 TABLET ORAL at 02:03

## 2024-03-19 RX ADMIN — FENTANYL CITRATE 25 MCG: 50 INJECTION, SOLUTION INTRAMUSCULAR; INTRAVENOUS at 09:03

## 2024-03-19 RX ADMIN — ACETAMINOPHEN 1000 MG: 10 INJECTION, SOLUTION INTRAVENOUS at 10:03

## 2024-03-19 RX ADMIN — HYDRALAZINE HYDROCHLORIDE 10 MG: 20 INJECTION INTRAMUSCULAR; INTRAVENOUS at 11:03

## 2024-03-19 RX ADMIN — DEXTROSE MONOHYDRATE 1.5 G: 5 INJECTION INTRAVENOUS at 09:03

## 2024-03-19 RX ADMIN — GLYCOPYRROLATE 0.2 MG: 0.2 INJECTION INTRAMUSCULAR; INTRAVENOUS at 09:03

## 2024-03-19 RX ADMIN — PHENYLEPHRINE HYDROCHLORIDE 100 MCG: 10 INJECTION INTRAVENOUS at 09:03

## 2024-03-19 RX ADMIN — PROTAMINE SULFATE 100 MG: 10 INJECTION, SOLUTION INTRAVENOUS at 10:03

## 2024-03-19 RX ADMIN — DOCUSATE SODIUM 100 MG: 100 CAPSULE, LIQUID FILLED ORAL at 08:03

## 2024-03-19 RX ADMIN — FENTANYL CITRATE 50 MCG: 50 INJECTION, SOLUTION INTRAMUSCULAR; INTRAVENOUS at 09:03

## 2024-03-19 RX ADMIN — LIDOCAINE HYDROCHLORIDE 80 MG: 20 INJECTION, SOLUTION EPIDURAL; INFILTRATION; INTRACAUDAL; PERINEURAL at 09:03

## 2024-03-19 RX ADMIN — HEPARIN SODIUM 10000 UNITS: 1000 INJECTION, SOLUTION INTRAVENOUS; SUBCUTANEOUS at 10:03

## 2024-03-19 RX ADMIN — PHENYLEPHRINE HYDROCHLORIDE 150 MCG: 10 INJECTION INTRAVENOUS at 10:03

## 2024-03-19 RX ADMIN — ONDANSETRON 4 MG: 2 INJECTION INTRAMUSCULAR; INTRAVENOUS at 10:03

## 2024-03-19 RX ADMIN — MUPIROCIN: 20 OINTMENT TOPICAL at 08:03

## 2024-03-19 RX ADMIN — METOPROLOL SUCCINATE 12.5 MG: 25 TABLET, EXTENDED RELEASE ORAL at 08:03

## 2024-03-19 RX ADMIN — PHENYLEPHRINE HYDROCHLORIDE 50 MCG: 10 INJECTION INTRAVENOUS at 10:03

## 2024-03-19 RX ADMIN — SUGAMMADEX 200 MG: 100 INJECTION, SOLUTION INTRAVENOUS at 10:03

## 2024-03-19 RX ADMIN — PROPOFOL 150 MG: 10 INJECTION, EMULSION INTRAVENOUS at 09:03

## 2024-03-19 RX ADMIN — MIDAZOLAM HYDROCHLORIDE 1 MG: 1 INJECTION, SOLUTION INTRAMUSCULAR; INTRAVENOUS at 09:03

## 2024-03-19 RX ADMIN — SODIUM CHLORIDE, SODIUM GLUCONATE, SODIUM ACETATE, POTASSIUM CHLORIDE AND MAGNESIUM CHLORIDE: 526; 502; 368; 37; 30 INJECTION, SOLUTION INTRAVENOUS at 09:03

## 2024-03-19 RX ADMIN — METFORMIN HYDROCHLORIDE 1000 MG: 500 TABLET, FILM COATED ORAL at 08:03

## 2024-03-19 RX ADMIN — DEXAMETHASONE SODIUM PHOSPHATE 8 MG: 4 INJECTION, SOLUTION INTRA-ARTICULAR; INTRALESIONAL; INTRAMUSCULAR; INTRAVENOUS; SOFT TISSUE at 09:03

## 2024-03-19 RX ADMIN — ACETAMINOPHEN 650 MG: 325 TABLET, FILM COATED ORAL at 09:03

## 2024-03-19 NOTE — TRANSFER OF CARE
"Anesthesia Transfer of Care Note    Patient: Alexys Madera    Procedure(s) Performed: Procedure(s) (LRB):  ENDARTERECTOMY, FEMORAL (Left)    Patient location: PACU    Anesthesia Type: general    Transport from OR: Transported from OR on room air with adequate spontaneous ventilation    Post pain: adequate analgesia    Post assessment: no apparent anesthetic complications and tolerated procedure well    Post vital signs: stable    Level of consciousness: responds to stimulation    Nausea/Vomiting: no nausea/vomiting    Complications: none    Transfer of care protocol was followed      Last vitals: Visit Vitals  /60   Pulse 64   Temp 36.6 °C (97.9 °F)   Resp 20   Ht 5' 1" (1.549 m)   Wt 65.4 kg (144 lb 2.9 oz)   SpO2 97%   Breastfeeding No   BMI 27.24 kg/m²     "

## 2024-03-19 NOTE — NURSING
Nurses Note -- 4 Eyes      3/19/2024   5:13 PM      Skin assessed during: Admit      [x] No Altered Skin Integrity Present    []Prevention Measures Documented      [] Yes- Altered Skin Integrity Present or Discovered   [] LDA Added if Not in Epic (Describe Wound)   [] New Altered Skin Integrity was Present on Admit and Documented in LDA   [] Wound Image Taken    Wound Care Consulted? No    Attending Nurse:  Darron Katz RN/Staff Member:   Amber

## 2024-03-19 NOTE — OP NOTE
[facility]                       [address] [place] [address]    PATIENT NAME:      ADENIKE SUÁREZ  YOB: 1950  CSN:               [id number]  MRN:               [id number]  ADMIT DATE:        03/19/2024 06:50:00  PHYSICIAN:         Tania Elizondo MD                          OPERATIVE REPORT      DATE OF SURGERY:    03/19/2024 00:00:00    SURGEON:  Tania Elizondo MD    PREOPERATIVE DIAGNOSIS:  Left lower extremity claudication with severe common   femoral artery stenosis.    PROCEDURES:  Left common femoral artery endarterectomy, profunda endarterectomy,   proximal SFA endarterectomy with patch angioplasty.    POSTOPERATIVE DIAGNOSIS:  Left lower extremity claudication with severe common   femoral artery stenosis.    ASSISTANT:  Courtney Andres NP.    BLOOD LOSS:  Minimal.    ANESTHESIA:  General.    TECHNIQUE:  Under informed consent, the patient was taken to the OR, put in   supine position, anesthesia was induced and therefore maintained for remainder   of procedure.  All pressure points padded equally.  Skin of the abdomen and   pelvis was prepped and draped in usual sterile fashion.  IV antibiotics were   administered.  An incision was made over the area of the common femoral artery   exposing the distal external iliac common femoral artery and SFA with profunda   femoris.  The patient was heparinized.  Clamps were applied on the distal   external iliac on the profunda and the SFA.  Arteriotomy was performed with   extension in the SFA.  There was a lot of calcium there.  This was definitely   very tight stenosis.  Endarterectomy was performed with the endarterectomy of   the origin of the profunda femoris artery.  The defect was cleaned very well and   closed over pericardial patch with de-airing prior to tying the knots.  The   flow was then reestablished to the SFA and profunda femoris artery.  Heparin  was   reversed with protamine.  The wounds were irrigated and closed in layers with   absorbable sutures.  The patient tolerated the procedure well.  She was   transferred to the recovery room in stable condition.        ______________________________  MD MACO Nguyen/NEHAL  DD:  03/19/2024  Time:  01:32PM  DT:  03/19/2024  Time:  02:46PM  Job #:  [id number]      OPERATIVE REPORT

## 2024-03-19 NOTE — ANESTHESIA POSTPROCEDURE EVALUATION
Anesthesia Post Evaluation    Patient: Alexys Madera    Procedure(s) Performed: Procedure(s) (LRB):  ENDARTERECTOMY, FEMORAL (Left)    Final Anesthesia Type: general      Patient location during evaluation: PACU  Patient participation: Yes- Able to Participate  Level of consciousness: awake  Post-procedure vital signs: reviewed and stable  Pain management: adequate  Airway patency: patent  DILLON mitigation strategies: Postoperative administration of CPAP, nasopharyngeal airway, or oral appliance in the postanesthesia care unit (PACU)  PONV status at discharge: No PONV  Anesthetic complications: no      Cardiovascular status: hemodynamically stable  Respiratory status: unassisted and spontaneous ventilation  Hydration status: euvolemic  Follow-up not needed.          Vitals Value Taken Time   /77 03/19/24 1151   Temp 36.6 °C (97.9 °F) 03/19/24 1127   Pulse 66 03/19/24 1158   Resp 22 03/19/24 1158   SpO2 98 % 03/19/24 1158   Vitals shown include unvalidated device data.      No case tracking events are documented in the log.      Pain/Deven Score: Deven Score: 8 (3/19/2024 11:24 AM)

## 2024-03-19 NOTE — ANESTHESIA PREPROCEDURE EVALUATION
03/19/2024  Alexys Madera is a 74 y.o., female.      Pre-op Assessment    I have reviewed the Patient Summary Reports.     I have reviewed the Nursing Notes. I have reviewed the NPO Status.      Review of Systems  Anesthesia Hx:    ponv              Cardiovascular:     Hypertension   CAD  asymptomatic CABG/stent                                     Pulmonary:        Sleep Apnea                Neurological:  Neurology Normal                                      Endocrine:  Diabetes, well controlled Hypothyroidism              Physical Exam  General: Well nourished, Cooperative, Alert and Oriented    Airway:  Mallampati: III   Mouth Opening: Normal  TM Distance: 4 - 6 cm  Tongue: Normal  Neck ROM: Normal ROM    Dental:  Intact    Chest/Lungs:  Normal Respiratory Rate, Clear to auscultation    Heart:  Rate: Normal  Rhythm: Regular Rhythm    Anesthesia Plan  Type of Anesthesia, risks & benefits discussed:    Anesthesia Type: Gen ETT  Intra-op Monitoring Plan: Standard ASA Monitors  Post Op Pain Control Plan: multimodal analgesia  Induction:  IV  Airway Plan: Direct and Video  ASA Score: 3    Ready For Surgery From Anesthesia Perspective.   .

## 2024-03-19 NOTE — ANESTHESIA PROCEDURE NOTES
Intubation    Date/Time: 3/19/2024 9:21 AM    Performed by: Candido Hull CRNA  Authorized by: Rodolfo Burns MD    Intubation:     Induction:  Intravenous    Intubated:  Postinduction    Mask Ventilation:  Easy with oral airway    Attempts:  1    Attempted By:  Student    Method of Intubation:  Direct    Blade:  El 3    Laryngeal View Grade: Grade IIA - cords partially seen      Difficult Airway Encountered?: No      Complications:  None    Airway Device:  Oral endotracheal tube    Airway Device Size:  7.5    Style/Cuff Inflation:  Cuffed (inflated to minimal occlusive pressure)    Inflation Amount (mL):  6    Tube secured:  21    Secured at:  The lips    Placement Verified By:  Capnometry    Complicating Factors:  None    Findings Post-Intubation:  BS equal bilateral  Notes:      Anterior airway

## 2024-03-20 VITALS
HEIGHT: 61 IN | WEIGHT: 144.19 LBS | BODY MASS INDEX: 27.22 KG/M2 | HEART RATE: 70 BPM | DIASTOLIC BLOOD PRESSURE: 61 MMHG | OXYGEN SATURATION: 96 % | SYSTOLIC BLOOD PRESSURE: 146 MMHG | TEMPERATURE: 99 F | RESPIRATION RATE: 18 BRPM

## 2024-03-20 LAB
ANION GAP SERPL CALC-SCNC: 9 MEQ/L
BUN SERPL-MCNC: 28 MG/DL (ref 9.8–20.1)
CALCIUM SERPL-MCNC: 8.9 MG/DL (ref 8.4–10.2)
CHLORIDE SERPL-SCNC: 105 MMOL/L (ref 98–107)
CO2 SERPL-SCNC: 25 MMOL/L (ref 23–31)
CREAT SERPL-MCNC: 0.83 MG/DL (ref 0.55–1.02)
CREAT/UREA NIT SERPL: 34
GFR SERPLBLD CREATININE-BSD FMLA CKD-EPI: >60 MLS/MIN/1.73/M2
GLUCOSE SERPL-MCNC: 236 MG/DL (ref 82–115)
POCT GLUCOSE: 206 MG/DL (ref 70–110)
POCT GLUCOSE: 333 MG/DL (ref 70–110)
POCT GLUCOSE: 348 MG/DL (ref 70–110)
POTASSIUM SERPL-SCNC: 4.2 MMOL/L (ref 3.5–5.1)
SODIUM SERPL-SCNC: 139 MMOL/L (ref 136–145)

## 2024-03-20 PROCEDURE — 80048 BASIC METABOLIC PNL TOTAL CA: CPT | Performed by: PHYSICIAN ASSISTANT

## 2024-03-20 PROCEDURE — 25000003 PHARM REV CODE 250: Performed by: PHYSICIAN ASSISTANT

## 2024-03-20 PROCEDURE — 99024 POSTOP FOLLOW-UP VISIT: CPT | Mod: POP,,,

## 2024-03-20 RX ORDER — HYDROCODONE BITARTRATE AND ACETAMINOPHEN 5; 325 MG/1; MG/1
1 TABLET ORAL EVERY 6 HOURS PRN
Qty: 30 TABLET | Refills: 0 | Status: SHIPPED | OUTPATIENT
Start: 2024-03-20

## 2024-03-20 RX ADMIN — CHOLECALCIFEROL TAB 10 MCG (400 UNIT) 2000 UNITS: 10 TAB at 09:03

## 2024-03-20 RX ADMIN — CLOPIDOGREL BISULFATE 75 MG: 75 TABLET ORAL at 09:03

## 2024-03-20 RX ADMIN — MUPIROCIN: 20 OINTMENT TOPICAL at 09:03

## 2024-03-20 RX ADMIN — ASPIRIN 81 MG: 81 TABLET, COATED ORAL at 09:03

## 2024-03-20 RX ADMIN — LISINOPRIL 40 MG: 20 TABLET ORAL at 09:03

## 2024-03-20 RX ADMIN — METOPROLOL SUCCINATE 12.5 MG: 25 TABLET, EXTENDED RELEASE ORAL at 09:03

## 2024-03-20 RX ADMIN — LEVOTHYROXINE SODIUM 112 MCG: 112 TABLET ORAL at 09:03

## 2024-03-20 RX ADMIN — AMLODIPINE BESYLATE 5 MG: 5 TABLET ORAL at 09:03

## 2024-03-20 RX ADMIN — METFORMIN HYDROCHLORIDE 1000 MG: 500 TABLET, FILM COATED ORAL at 09:03

## 2024-03-20 RX ADMIN — ATORVASTATIN CALCIUM 40 MG: 40 TABLET, FILM COATED ORAL at 09:03

## 2024-03-20 RX ADMIN — HYDROCHLOROTHIAZIDE 25 MG: 25 TABLET ORAL at 09:03

## 2024-03-20 NOTE — PLAN OF CARE
03/20/24 1038   Discharge Assessment   Assessment Type Discharge Planning Assessment   Confirmed/corrected address, phone number and insurance Yes   Source of Information patient   When was your last doctors appointment? 01/15/24   Communicated FRED with patient/caregiver Yes   Reason For Admission lt femoral endarctectomy   People in Home spouse   Do you expect to return to your current living situation? Yes   Do you have help at home or someone to help you manage your care at home? Yes   Who are your caregiver(s) and their phone number(s)? , Heidy (dgt)   Prior to hospitilization cognitive status: Alert/Oriented   Current cognitive status: Alert/Oriented   Walking or Climbing Stairs Difficulty no   Dressing/Bathing Difficulty yes   Dressing/Bathing bathing difficulty, requires equipment   Dressing/Bathing Management grab bars   Home Layout Able to live on 1st floor   Equipment Currently Used at Home CPAP;grab bar;glucometer   Readmission within 30 days? No   Patient currently being followed by outpatient case management? No   Do you currently have service(s) that help you manage your care at home? No   Do you take prescription medications? Yes   Do you have any problems affording any of your prescribed medications? No   Is the patient taking medications as prescribed? yes   Who is going to help you get home at discharge? Brittany (dgt)   How do you get to doctors appointments? car, drives self   Are you on dialysis? No   Do you take coumadin? No   Discharge Plan A Home with family   Discharge Plan B Home   DME Needed Upon Discharge  none   Discharge Plan discussed with: Patient;Adult children   Transition of Care Barriers None   Financial Resource Strain   How hard is it for you to pay for the very basics like food, housing, medical care, and heating? Somewhat   Housing Stability   In the last 12 months, was there a time when you were not able to pay the mortgage or rent on time? N   In the last 12 months,  was there a time when you did not have a steady place to sleep or slept in a shelter (including now)? N   Transportation Needs   In the past 12 months, has lack of transportation kept you from medical appointments or from getting medications? no   In the past 12 months, has lack of transportation kept you from meetings, work, or from getting things needed for daily living? No   Food Insecurity   Within the past 12 months, you worried that your food would run out before you got the money to buy more. Never true   Within the past 12 months, the food you bought just didn't last and you didn't have money to get more. Never true   Social Connections   In a typical week, how many times do you talk on the phone with family, friends, or neighbors? More than 3   How often do you get together with friends or relatives? More than 3   How often do you attend Zoroastrianism or Roman Catholic services? More than 4   Are you , , , , never , or living with a partner?    Alcohol Use   Q1: How often do you have a drink containing alcohol? Never   Q2: How many drinks containing alcohol do you have on a typical day when you are drinking? None   Q3: How often do you have six or more drinks on one occasion? Never   OTHER   Name(s) of People in Home priscila     Pharmacy:[facility] in [facility] [address]

## 2024-03-20 NOTE — PLAN OF CARE
03/20/24 1034   Medicare Message   Important Message from Medicare regarding Discharge Appeal Rights Given to patient/caregiver;Explained to patient/caregiver     Pt voiced understanding of IMM.

## 2024-03-20 NOTE — PROGRESS NOTES
PROGRESS NOTE    Admit Date: 3/19/2024  Hospital Day: 1  Procedure: 1 Day Post-Op L fem endart 3/19    Subjective:  AF, HDS  Pain controlled  Tolerated diet, no N/V  No fevers/chills  Not yet OOB     Objective:  Inpatient Data      Vitals:   Temp:  [97.9 °F (36.6 °C)-98.4 °F (36.9 °C)]   Pulse:  []   Resp:  [14-29]   BP: ()/(45-98)   SpO2:  [90 %-99 %]     Diet diabetic   Intake/Output Summary (Last 24 hours) at 3/20/2024 0731  Last data filed at 3/20/2024 0556  Gross per 24 hour   Intake 1200 ml   Output 1750 ml   Net -550 ml          Recent Labs     03/20/24  0515      K 4.2   CO2 25   BUN 28.0*   CREATININE 0.83   CALCIUM 8.9     Scheduled Meds: amLODIPine, 5 mg, Daily  aspirin, 81 mg, Daily  atorvastatin, 40 mg, Daily  cholecalciferol (vitamin D3), 2,000 Units, Daily  clopidogreL, 75 mg, Daily  docusate sodium, 100 mg, BID  hydroCHLOROthiazide, 25 mg, Daily  levothyroxine, 112 mcg, Daily  lisinopriL, 40 mg, Daily  loperamide, 4 mg, Once  metFORMIN, 1,000 mg, BID  metoprolol succinate, 12.5 mg, BID  mupirocin, , BID        Physical Exam:  General: no acute distress, resting in bed  CV: RR  Pulm: normal wob on room air  Abd: abdomen soft, nd, nttp  Ext: moves all spontaneously. Incision to left groin with dressing in place. Small amount of strikethrough. Otherwise c/d/I. Patient reports improved sensation to LLE   Skin: warm and dry    Assessment/Plan:  74F s/p L endart 3/19    - AF, HDS  - MM pain control  - Haas in place, plan d/c this AM, void trial  - OOB as tolerated  - Dispo: home pending continued improvement     Brad Douglas  [employer] General Surgery, PGY4

## 2024-03-20 NOTE — DISCHARGE SUMMARY
"[facility]  Cardiothoracic Surgery  Discharge Summary      Patient Name: Alexys Madera  MRN: [id number]  Admission Date: 3/19/2024  Hospital Length of Stay: 1 days  Discharge Date and Time:  03/20/2024 8:37 AM  Attending Physician: Tania Elizondo MD   Discharging Provider: MARIELLA Man  Primary Care Provider: Edenilson Redmond MD    HPI:   No notes on file    Procedure(s) (LRB):  ENDARTERECTOMY, FEMORAL (Left)      Indwelling Lines/Drains at time of discharge:   Lines/Drains/Airways       Drain  Duration                  Urethral Catheter 03/19/24 1 day                  Hospital Course: No notes on file    Goals of Care Treatment Preferences:             Significant Diagnostic Studies: Labs: CMP   Recent Labs   Lab 03/20/24  0515      K 4.2   CO2 25   BUN 28.0*   CREATININE 0.83   CALCIUM 8.9    and CBC No results for input(s): "WBC", "HGB", "HCT", "PLT" in the last 48 hours.    Pending Diagnostic Studies:       Procedure Component Value Units Date/Time    Specimen to Pathology [[id number]] Collected: 03/19/24 1044    Order Status: Sent Lab Status: In process Updated: 03/19/24 1358    Specimen: Tissue from Femoral Head, Left             No new Assessment & Plan notes have been filed under this hospital service since the last note was generated.  Service: Cardiovascular Surgery    There are no hospital problems to display for this patient.     Discharged Condition: good    Disposition: Home or Self Care    Follow Up:    Patient Instructions:   No discharge procedures on file.  Medications:  Reconciled Home Medications:      Medication List        START taking these medications      HYDROcodone-acetaminophen 5-325 mg per tablet  Commonly known as: NORCO  Take 1 tablet by mouth every 6 (six) hours as needed for Pain.            CHANGE how you take these medications      LANTUS SOLOSTAR U-100 INSULIN glargine 100 units/mL SubQ pen  Generic drug: insulin  INJECT " SUBCUTANEOUSLY 40 UNITS  ONCE DAILY  What changed: See the new instructions.            CONTINUE taking these medications      amLODIPine 5 MG tablet  Commonly known as: NORVASC  Take 5 mg by mouth once daily.     aspirin 81 MG EC tablet  Commonly known as: ECOTRIN  Take 81 mg by mouth once daily.     atorvastatin 40 MG tablet  Commonly known as: LIPITOR  Take 40 mg by mouth once daily.     cholecalciferol (vitamin D3) 50 mcg (2,000 unit) Cap capsule  Commonly known as: VITAMIN D3  Take 2,000 Units by mouth once daily.     clopidogreL 75 mg tablet  Commonly known as: PLAVIX  Take 75 mg by mouth once daily.     empagliflozin 25 mg tablet  Commonly known as: Jardiance  Take 1 tablet (25 mg total) by mouth once daily.     GVOKE HYPOPEN 1-PACK 1 mg/0.2 mL Atin  Generic drug: glucagon  2 (two) times daily as needed (hypoglycemia).     hydroCHLOROthiazide 25 MG tablet  Commonly known as: HYDRODIURIL  TAKE 1 TABLET BY MOUTH  DAILY     levothyroxine 112 MCG tablet  Commonly known as: SYNTHROID  TAKE 1 TABLET BY MOUTH  DAILY     lisinopriL 40 MG tablet  Commonly known as: PRINIVIL,ZESTRIL  TAKE 1 TABLET BY MOUTH  DAILY     metFORMIN 1000 MG tablet  Commonly known as: GLUCOPHAGE  TAKE 1 TABLET BY MOUTH  TWICE DAILY     metoprolol succinate 25 MG 24 hr tablet  Commonly known as: TOPROL-XL  Take 12.5 mg by mouth 2 (two) times daily.     REPATHA PUSHTRONEX 420 mg/3.5 mL Injt  Generic drug: evolocumab  SMARTSI SUB-Q Once a Month            STOP taking these medications      fluticasone propionate 50 mcg/actuation nasal spray  Commonly known as: FLONASE     neomycin-polymyxin-dexamethasone 3.5 mg/g-10,000 unit/g-0.1 % Oint  Commonly known as: DEXACINE            Time spent on the discharge of patient: 30 minutes    MARIELLA Man  Cardiothoracic Surgery  [facility]

## 2024-03-21 LAB — PSYCHE PATHOLOGY RESULT: NORMAL

## 2024-03-22 ENCOUNTER — PATIENT OUTREACH (OUTPATIENT)
Dept: ADMINISTRATIVE | Facility: CLINIC | Age: 74
End: 2024-03-22
Payer: MEDICARE

## 2024-03-22 NOTE — PROGRESS NOTES
C3 nurse spoke with Alexys Madera  for a TCC post hospital discharge follow up call. The patient has a scheduled HOSFU appointment with Alen Peraza FNP 3/28/24 @1:40pm

## 2024-03-28 ENCOUNTER — OFFICE VISIT (OUTPATIENT)
Dept: INTERNAL MEDICINE | Facility: CLINIC | Age: 74
End: 2024-03-28
Payer: MEDICARE

## 2024-03-28 VITALS
RESPIRATION RATE: 18 BRPM | OXYGEN SATURATION: 97 % | TEMPERATURE: 98 F | SYSTOLIC BLOOD PRESSURE: 132 MMHG | HEART RATE: 91 BPM | WEIGHT: 146 LBS | BODY MASS INDEX: 27.59 KG/M2 | DIASTOLIC BLOOD PRESSURE: 84 MMHG

## 2024-03-28 DIAGNOSIS — I73.9 PAD (PERIPHERAL ARTERY DISEASE): Primary | Chronic | ICD-10-CM

## 2024-03-28 DIAGNOSIS — Z98.890 HX OF ENDARTERECTOMY: ICD-10-CM

## 2024-03-28 PROCEDURE — 99495 TRANSJ CARE MGMT MOD F2F 14D: CPT | Mod: ,,,

## 2024-03-28 NOTE — PROGRESS NOTES
Patient ID: Alexys Madera is a 74 y.o. female.    Chief Complaint: Follow-up (Pt states she had surgery, states she has been doing well since discharge, states incision site is healing properly )      Transitional Care Note    Family and/or Caretaker present at visit?  No.  Diagnostic tests reviewed/disposition: No diagnosic tests pending after this hospitalization.  Home health/community services discussion/referrals: Patient does not have home health established from hospital visit.  They do not need home health.  If needed, we will set up home health for the patient.   Establishment or re-establishment of referral orders for community resources: No other necessary community resources.   Discussion with other health care providers: No discussion with other health care providers necessary.   I have reviewed the following:     Details / Date    [x]   Labs     []   Micro     []   Pathology     [x]   Imaging     [x]   Cardiology Procedures     []   Other         73-year-old female here today for hospital discharge follow-up.  Medical comorbidities include CAD s/p CABG, PAD, HTN, HLD,DMII,  hypothyroidism, and DILLON.    Since last visit patient had recent hospital admission 03/19/2024 for left-sided lower extremity claudication.orkup with Dr. Bueno revealed evidence of a common femoral artery disease and popliteal artery disease.   Subsequently underwent  left common femoral artery endarterectomy(03/19/2024), tolerated procedure well.  Today reports no pain since procedure, with near resolution of claudication symptoms.  Surgical site doing well without signs of infections.  Vital signs stable.  Otherwise stable postoperatively.     Wellness: 05/08/2023                   MEDICAL HISTORY:    Past Medical History:   Diagnosis Date    CAD (coronary artery disease) 11/07/2022    Hypothyroidism 11/07/2022    Mixed hyperlipidemia 11/07/2022    DILLON (obstructive sleep apnea) 11/07/2022    uses CPAP    PAD (peripheral artery  disease) 11/07/2022    PONV (postoperative nausea and vomiting)     Primary hypertension 11/07/2022    Type 2 diabetes mellitus with hyperglycemia, with long-term current use of insulin 11/07/2022      Past Surgical History:   Procedure Laterality Date    CHOLECYSTECTOMY      COLONOSCOPY  2022    CORONARY ARTERY BYPASS GRAFT      3 vessel    ENDARTERECTOMY OF FEMORAL ARTERY Left 3/19/2024    Procedure: ENDARTERECTOMY, FEMORAL;  Surgeon: Tania Elizondo MD;  Location: [facility] OR;  Service: Cardiovascular;  Laterality: Left;  LEFT CFA //  XX    ESOPHAGOGASTRODUODENOSCOPY      HYSTERECTOMY      INSERTION OF STENT INTO PERIPHERAL VESSEL      LEFT HEART CATHETERIZATION      NASAL SEPTUM SURGERY        Social History     Tobacco Use    Smoking status: Never    Smokeless tobacco: Never   Substance Use Topics    Alcohol use: Not Currently     Comment: rarely    Drug use: Never          Health Maintenance Due   Topic Date Due    Hepatitis C Screening  Never done    Diabetes Urine Screening  Never done    Foot Exam  Never done    RSV Vaccine (Age 60+ and Pregnant patients) (1 - 1-dose 60+ series) Never done    Shingles Vaccine (2 of 3) 12/08/2016    COVID-19 Vaccine (6 - 2023-24 season) 09/01/2023    Mammogram  09/12/2023    Hemoglobin A1c  02/03/2024          Patient Care Team:  Edenilson Redmond MD as PCP - General (Internal Medicine)  Edenilson Redmond MD Cheeran, Bose D, MD as Consulting Physician (Cardiology)      Review of Systems   Constitutional:  Negative for fatigue and fever.   HENT:  Negative for congestion, rhinorrhea, sore throat and trouble swallowing.    Eyes:  Negative for redness and visual disturbance.   Respiratory:  Negative for cough, chest tightness and shortness of breath.    Cardiovascular:  Negative for chest pain and palpitations.   Gastrointestinal:  Negative for abdominal pain, constipation, diarrhea, nausea and vomiting.   Genitourinary:  Negative for dysuria, flank pain, frequency and  urgency.   Musculoskeletal:  Negative for arthralgias, gait problem and myalgias.   Skin:  Positive for wound. Negative for rash.   Neurological:  Negative for facial asymmetry, speech difficulty, weakness and headaches.   All other systems reviewed and are negative.      Objective:   /84 (BP Location: Left arm, Patient Position: Sitting, BP Method: Small (Automatic))   Pulse 91   Temp 98.1 °F (36.7 °C) (Temporal)   Resp 18   Wt 66.2 kg (146 lb)   SpO2 97%   BMI 27.59 kg/m²      Physical Exam  Constitutional:       General: She is not in acute distress.     Appearance: Normal appearance.   HENT:      Right Ear: Tympanic membrane, ear canal and external ear normal.      Left Ear: Tympanic membrane, ear canal and external ear normal.      Nose: Nose normal.      Mouth/Throat:      Mouth: Mucous membranes are moist.      Pharynx: Oropharynx is clear.   Eyes:      Extraocular Movements: Extraocular movements intact.      Conjunctiva/sclera: Conjunctivae normal.      Pupils: Pupils are equal, round, and reactive to light.   Cardiovascular:      Rate and Rhythm: Normal rate and regular rhythm.      Pulses: Normal pulses.      Heart sounds: Normal heart sounds. No murmur heard.     No gallop.   Pulmonary:      Effort: Pulmonary effort is normal.      Breath sounds: Normal breath sounds. No wheezing.   Abdominal:      General: Bowel sounds are normal. There is no distension.      Palpations: Abdomen is soft. There is no mass.      Tenderness: There is no abdominal tenderness. There is no guarding.   Musculoskeletal:         General: Normal range of motion.   Skin:     General: Skin is warm and dry.      Findings: Wound present.             Comments: Surgical incision, well approximated.  No signs of infection   Neurological:      Mental Status: She is alert. Mental status is at baseline.      Sensory: No sensory deficit.      Motor: No weakness.           Assessment:       ICD-10-CM ICD-9-CM   1. PAD (peripheral  artery disease)  I73.9 443.9   2. Hx of endarterectomy  Z98.890 V15.29        Plan:     Problem List Items Addressed This Visit          Cardiac/Vascular    PAD (peripheral artery disease) - Primary (Chronic)     -currently on ASA, Plavix, and Repatha, continue         Hx of endarterectomy     -recent hospital admission for severe left lower extremity claudication  -s/p left-sided femoral endarterectomy  -currently on ASA, Plavix statin, continue               Follow up for Previously scheduled and PRN if need.   -plan specifics discussed above          Medication List with Changes/Refills   Current Medications    AMLODIPINE (NORVASC) 5 MG TABLET    Take 5 mg by mouth once daily.    ASPIRIN (ECOTRIN) 81 MG EC TABLET    Take 81 mg by mouth once daily.    ATORVASTATIN (LIPITOR) 40 MG TABLET    Take 40 mg by mouth once daily.    CHOLECALCIFEROL, VITAMIN D3, (VITAMIN D3) 50 MCG (2,000 UNIT) CAP CAPSULE    Take 2,000 Units by mouth once daily.    CLOPIDOGREL (PLAVIX) 75 MG TABLET    Take 75 mg by mouth once daily.    EMPAGLIFLOZIN (JARDIANCE) 25 MG TABLET    Take 1 tablet (25 mg total) by mouth once daily.    GVOKE HYPOPEN 1-PACK 1 MG/0.2 ML ATIN    2 (two) times daily as needed (hypoglycemia).    HYDROCHLOROTHIAZIDE (HYDRODIURIL) 25 MG TABLET    TAKE 1 TABLET BY MOUTH  DAILY    HYDROCODONE-ACETAMINOPHEN (NORCO) 5-325 MG PER TABLET    Take 1 tablet by mouth every 6 (six) hours as needed for Pain.    LANTUS SOLOSTAR U-100 INSULIN GLARGINE 100 UNITS/ML SUBQ PEN    INJECT SUBCUTANEOUSLY 40 UNITS  ONCE DAILY    LEVOTHYROXINE (SYNTHROID) 112 MCG TABLET    TAKE 1 TABLET BY MOUTH  DAILY    LISINOPRIL (PRINIVIL,ZESTRIL) 40 MG TABLET    TAKE 1 TABLET BY MOUTH  DAILY    METFORMIN (GLUCOPHAGE) 1000 MG TABLET    TAKE 1 TABLET BY MOUTH  TWICE DAILY    METOPROLOL SUCCINATE (TOPROL-XL) 25 MG 24 HR TABLET    Take 12.5 mg by mouth 2 (two) times daily.    REPATHA PUSHTRONEX 420 MG/3.5 ML INJT    SMARTSI SUB-Q Once a Month

## 2024-03-28 NOTE — ASSESSMENT & PLAN NOTE
-recent hospital admission for severe left lower extremity claudication  -s/p left-sided femoral endarterectomy  -currently on ASA, Plavix statin, continue

## 2024-04-10 ENCOUNTER — OFFICE VISIT (OUTPATIENT)
Dept: CARDIAC SURGERY | Facility: CLINIC | Age: 74
End: 2024-04-10
Payer: MEDICARE

## 2024-04-10 VITALS
WEIGHT: 145 LBS | HEART RATE: 74 BPM | HEIGHT: 61 IN | OXYGEN SATURATION: 98 % | SYSTOLIC BLOOD PRESSURE: 153 MMHG | BODY MASS INDEX: 27.38 KG/M2 | DIASTOLIC BLOOD PRESSURE: 78 MMHG

## 2024-04-10 DIAGNOSIS — I25.10 CORONARY ARTERY DISEASE, UNSPECIFIED VESSEL OR LESION TYPE, UNSPECIFIED WHETHER ANGINA PRESENT, UNSPECIFIED WHETHER NATIVE OR TRANSPLANTED HEART: Primary | Chronic | ICD-10-CM

## 2024-04-10 PROCEDURE — 99024 POSTOP FOLLOW-UP VISIT: CPT | Mod: POP,,, | Performed by: THORACIC SURGERY (CARDIOTHORACIC VASCULAR SURGERY)

## 2024-04-10 NOTE — PROGRESS NOTES
"Alexys Madera is a 74 y.o. female patient.   No diagnosis found.  Past Medical History:   Diagnosis Date    CAD (coronary artery disease) 11/07/2022    Hypothyroidism 11/07/2022    Mixed hyperlipidemia 11/07/2022    DILLON (obstructive sleep apnea) 11/07/2022    uses CPAP    PAD (peripheral artery disease) 11/07/2022    PONV (postoperative nausea and vomiting)     Primary hypertension 11/07/2022    Type 2 diabetes mellitus with hyperglycemia, with long-term current use of insulin 11/07/2022     No past surgical history pertinent negatives on file.  Scheduled Meds:  Continuous Infusions:  PRN Meds:    Review of patient's allergies indicates:  No Known Allergies  There are no hospital problems to display for this patient.    Blood pressure (!) 153/78, pulse 74, height 5' 1" (1.549 m), weight 65.8 kg (145 lb), SpO2 98 %.    Subjective:  The patient is doing very well status post left common femoral artery endarterectomy.  At this point she has no more symptoms.      Objective:  Her wounds healed well.  No evidence of infection.      Assessment & Plan:  Overall doing very well.  At this time I do not see any indication for a fem-pop bypass as she is asymptomatic.  RTC p.r.n.      Tania Elizondo MD  4/10/2024    "

## 2024-06-10 ENCOUNTER — TELEPHONE (OUTPATIENT)
Dept: INTERNAL MEDICINE | Facility: CLINIC | Age: 74
End: 2024-06-10
Payer: MEDICARE

## 2024-06-10 NOTE — TELEPHONE ENCOUNTER
----- Message from Pallavi Gore sent at 6/10/2024 11:30 AM CDT -----  Regarding: labs  Caller is requesting to schedule their Lab appointment prior to annual appointment.    Name of Caller:pt    Preferred Date and Time of Labs: tomorrow 6/11    Date of EPP Appointment:6/24    Where would they like the lab performed?[facility] ctr    Would the patient rather a call back or a response via [facility]? C/b    Best Call Back Number:[phone]    Additional Information:please fax over orders to [facility]

## 2024-06-17 LAB
CHOLEST SERPL-MSCNC: 133 MG/DL (ref 0–200)
HDLC SERPL-MCNC: 44 MG/DL (ref 35–70)
LDLC SERPL CALC-MCNC: 64 MG/DL (ref 0–160)
TRIGL SERPL-MCNC: 124 MG/DL (ref 40–160)

## 2024-06-18 ENCOUNTER — PATIENT OUTREACH (OUTPATIENT)
Facility: CLINIC | Age: 74
End: 2024-06-18
Payer: MEDICARE

## 2024-06-18 NOTE — PROGRESS NOTES
Health Maintenance Topic(s) Outreach Outcomes & Actions Taken:    Lab(s) - Outreach Outcomes & Actions Taken  : External Records Uploaded & Care Team Updated if Applicable       Additional Notes:  Lipid 6/17/24

## 2024-06-20 ENCOUNTER — TELEPHONE (OUTPATIENT)
Dept: INTERNAL MEDICINE | Facility: CLINIC | Age: 74
End: 2024-06-20
Payer: MEDICARE

## 2024-06-20 DIAGNOSIS — Z79.4 TYPE 2 DIABETES MELLITUS WITH HYPERGLYCEMIA, WITH LONG-TERM CURRENT USE OF INSULIN: ICD-10-CM

## 2024-06-20 DIAGNOSIS — E11.65 TYPE 2 DIABETES MELLITUS WITH HYPERGLYCEMIA, WITH LONG-TERM CURRENT USE OF INSULIN: ICD-10-CM

## 2024-06-20 DIAGNOSIS — I10 PRIMARY HYPERTENSION: ICD-10-CM

## 2024-06-20 RX ORDER — METFORMIN HYDROCHLORIDE 1000 MG/1
TABLET ORAL
Qty: 180 TABLET | Refills: 6 | Status: SHIPPED | OUTPATIENT
Start: 2024-06-20

## 2024-06-20 RX ORDER — LISINOPRIL 40 MG/1
TABLET ORAL
Qty: 90 TABLET | Refills: 6 | Status: SHIPPED | OUTPATIENT
Start: 2024-06-20

## 2024-06-20 RX ORDER — HYDROCHLOROTHIAZIDE 25 MG/1
TABLET ORAL
Qty: 90 TABLET | Refills: 6 | Status: SHIPPED | OUTPATIENT
Start: 2024-06-20

## 2024-06-20 NOTE — TELEPHONE ENCOUNTER
----- Message from Jesus Jerome sent at 6/20/2024  8:53 AM CDT -----  .Who Called: Alexys Madera    Caller is requesting information on test results.    Name of Test (Lab/Mammo/Etc): labs  Date of Test: 06/17/24  Where the test was performed: [facility]  Ordering Provider:     Preferred Method of Contact: Phone Call  Patient's Preferred Phone Number on File: [phone]   Best Call Back Number, if different:  Additional Information:  pt called inquiring if labs were received

## 2024-06-24 ENCOUNTER — OFFICE VISIT (OUTPATIENT)
Dept: INTERNAL MEDICINE | Facility: CLINIC | Age: 74
End: 2024-06-24
Payer: MEDICARE

## 2024-06-24 VITALS
WEIGHT: 145 LBS | HEART RATE: 87 BPM | BODY MASS INDEX: 27.38 KG/M2 | HEIGHT: 61 IN | RESPIRATION RATE: 16 BRPM | OXYGEN SATURATION: 100 % | SYSTOLIC BLOOD PRESSURE: 132 MMHG | DIASTOLIC BLOOD PRESSURE: 84 MMHG

## 2024-06-24 DIAGNOSIS — Z00.00 MEDICARE ANNUAL WELLNESS VISIT, SUBSEQUENT: Primary | ICD-10-CM

## 2024-06-24 DIAGNOSIS — E11.65 TYPE 2 DIABETES MELLITUS WITH HYPERGLYCEMIA, WITH LONG-TERM CURRENT USE OF INSULIN: ICD-10-CM

## 2024-06-24 DIAGNOSIS — I10 PRIMARY HYPERTENSION: Chronic | ICD-10-CM

## 2024-06-24 DIAGNOSIS — E78.2 MIXED HYPERLIPIDEMIA: Chronic | ICD-10-CM

## 2024-06-24 DIAGNOSIS — Z79.4 TYPE 2 DIABETES MELLITUS WITH HYPERGLYCEMIA, WITH LONG-TERM CURRENT USE OF INSULIN: ICD-10-CM

## 2024-06-24 DIAGNOSIS — E03.9 ACQUIRED HYPOTHYROIDISM: Chronic | ICD-10-CM

## 2024-06-24 PROCEDURE — G0439 PPPS, SUBSEQ VISIT: HCPCS | Mod: ,,, | Performed by: INTERNAL MEDICINE

## 2024-06-24 NOTE — PROGRESS NOTES
Edenilson Redmond MD        PATIENT NAME: Alexys Madera  : 1950  DATE: 24  MRN: [id number]      Patient Care Team:  Edenilson Redmond MD as PCP - General (Internal Medicine)  Edenilson Redmond MD Cheeran, Bose D, MD as Consulting Physician (Cardiology)       Billing Provider: Edenilson Redmond MD  Level of Service: PR MEDICARE ANNUAL WELLNESS SUBSEQUENT VISIT  Patient PCP Information       Provider PCP Type    Edenilson Redmond MD General            Reason for Visit / Chief Complaint: Medicare AWV       Update PCP  Update Chief Complaint         History of Present Illness / Problem Focused Workflow     Alexys Madera presents to the clinic with Medicare AWV     Patient is here for annual wellness exam   Over the last 6 months she has had issues having procedures surgeries she has not followed her lifestyle and diet or medications and she knows her diabetes is under poor control        Review of Systems   Review of Systems   Constitutional: Negative.    HENT: Negative.     Eyes: Negative.    Respiratory: Negative.     Cardiovascular: Negative.    Gastrointestinal: Negative.    Endocrine: Negative.    Genitourinary: Negative.    Musculoskeletal: Negative.    Integumentary:  Negative.   Neurological: Negative.    Psychiatric/Behavioral: Negative.          Patient Reported Health Risk Assessment       Medical / Social / Family History     Past Medical History:   Diagnosis Date    CAD (coronary artery disease) 2022    Hypothyroidism 2022    Mixed hyperlipidemia 2022    DILLON (obstructive sleep apnea) 2022    uses CPAP    PAD (peripheral artery disease) 2022    Primary hypertension 2022    Type 2 diabetes mellitus with hyperglycemia, with long-term current use of insulin 2022       Past Surgical History:   Procedure Laterality Date    CHOLECYSTECTOMY      COLONOSCOPY      CORONARY ARTERY BYPASS GRAFT      3 vessel    ENDARTERECTOMY OF FEMORAL ARTERY  Left 3/19/2024    Procedure: ENDARTERECTOMY, FEMORAL;  Surgeon: Tania Elizondo MD;  Location: [facility] OR;  Service: Cardiovascular;  Laterality: Left;  LEFT CFA //  XX    ESOPHAGOGASTRODUODENOSCOPY      HYSTERECTOMY      INSERTION OF STENT INTO PERIPHERAL VESSEL      LEFT HEART CATHETERIZATION      NASAL SEPTUM SURGERY         Social History  Ms. Madera  reports that she has never smoked. She has never been exposed to tobacco smoke. She has never used smokeless tobacco. She reports that she does not currently use alcohol. She reports that she does not use drugs.    Family History  Ms.'uriah Madera  family history includes PONV in her father.        Medications and Allergies     Medications  Outpatient Medications Marked as Taking for the 6/24/24 encounter (Office Visit) with Edenilson Redmond MD   Medication Sig Dispense Refill    amLODIPine (NORVASC) 5 MG tablet Take 5 mg by mouth once daily.      aspirin (ECOTRIN) 81 MG EC tablet Take 81 mg by mouth once daily.      atorvastatin (LIPITOR) 40 MG tablet Take 40 mg by mouth once daily.      cholecalciferol, vitamin D3, (VITAMIN D3) 50 mcg (2,000 unit) Cap capsule Take 2,000 Units by mouth once daily.      clopidogreL (PLAVIX) 75 mg tablet Take 75 mg by mouth once daily.      empagliflozin (JARDIANCE) 25 mg tablet Take 1 tablet (25 mg total) by mouth once daily. 30 tablet 11    GVOKE HYPOPEN 1-PACK 1 mg/0.2 mL AtIn 2 (two) times daily as needed (hypoglycemia).      hydroCHLOROthiazide (HYDRODIURIL) 25 MG tablet TAKE 1 TABLET BY MOUTH DAILY 90 tablet 6    HYDROcodone-acetaminophen (NORCO) 5-325 mg per tablet Take 1 tablet by mouth every 6 (six) hours as needed for Pain. 30 tablet 0    LANTUS SOLOSTAR U-100 INSULIN glargine 100 units/mL SubQ pen INJECT SUBCUTANEOUSLY 40 UNITS  ONCE DAILY (Patient taking differently: Inject 35 Units into the skin once daily.) 15 mL 8    levothyroxine (SYNTHROID) 112 MCG tablet TAKE 1 TABLET BY MOUTH  DAILY 90 tablet 3    lisinopriL  (PRINIVIL,ZESTRIL) 40 MG tablet TAKE 1 TABLET BY MOUTH DAILY 90 tablet 6    metFORMIN (GLUCOPHAGE) 1000 MG tablet TAKE 1 TABLET BY MOUTH TWICE  DAILY 180 tablet 6    metoprolol succinate (TOPROL-XL) 25 MG 24 hr tablet Take 12.5 mg by mouth 2 (two) times daily.      REPATHA PUSHTRONEX 420 mg/3.5 mL Injt SMARTSI SUB-Q Once a Month         Allergies  Review of patient's allergies indicates:  No Known Allergies    Physical Examination     Vitals:    24 1311   BP: 132/84   Pulse: 87   Resp: 16     Physical Exam  Constitutional:       Appearance: Normal appearance.   HENT:      Head: Normocephalic and atraumatic.      Right Ear: Tympanic membrane normal.      Left Ear: Tympanic membrane normal.      Nose: Nose normal.      Mouth/Throat:      Mouth: Mucous membranes are moist.   Eyes:      Extraocular Movements: Extraocular movements intact.      Pupils: Pupils are equal, round, and reactive to light.   Cardiovascular:      Rate and Rhythm: Normal rate and regular rhythm.      Pulses: Normal pulses.           Dorsalis pedis pulses are 2+ on the right side and 2+ on the left side.        Posterior tibial pulses are 2+ on the right side and 2+ on the left side.   Pulmonary:      Effort: Pulmonary effort is normal.      Breath sounds: Normal breath sounds.   Abdominal:      General: Abdomen is flat. Bowel sounds are normal.      Palpations: Abdomen is soft.   Musculoskeletal:         General: Normal range of motion.      Cervical back: Normal range of motion and neck supple.   Feet:      Right foot:      Protective Sensation: 2 sites tested.  2 sites sensed.      Skin integrity: Skin integrity normal.      Left foot:      Protective Sensation:   2 sites sensed.      Skin integrity: Skin integrity normal.   Skin:     General: Skin is warm and dry.   Neurological:      General: No focal deficit present.      Mental Status: She is alert and oriented to person, place, and time.   Psychiatric:         Mood and Affect:  Mood normal.         Behavior: Behavior normal.               No data to display                  6/24/2024     1:20 PM 4/10/2024     9:10 AM 3/28/2024     1:40 PM 3/13/2024     9:30 AM 12/4/2023     9:00 AM 8/14/2023     8:20 AM 5/8/2023     8:40 AM   Fall Risk Assessment - Outpatient   Mobility Status Ambulatory Ambulatory Ambulatory Ambulatory Ambulatory Ambulatory Ambulatory   Number of falls 0 0 0 0 0 0 0   Identified as fall risk False False False False False False False                Assessment and Plan (including Health Maintenance)      Problem List  Smart Sets  Document Outside HM   :    Plan:       ICD-10-CM ICD-9-CM   1. Medicare annual wellness visit, subsequent  Z00.00 V70.0   2. Type 2 diabetes mellitus with hyperglycemia, with long-term current use of insulin  E11.65 250.00    Z79.4 790.29     V58.67   3. Primary hypertension  I10 401.9   4. Mixed hyperlipidemia  E78.2 272.2   5. Acquired hypothyroidism  E03.9 244.9               Health Maintenance Due   Topic Date Due    Hepatitis C Screening  Never done    Diabetes Urine Screening  Never done    Foot Exam  Never done    RSV Vaccine (Age 60+ and Pregnant patients) (1 - 1-dose 60+ series) Never done    Shingles Vaccine (2 of 3) 12/08/2016    COVID-19 Vaccine (6 - 2023-24 season) 09/01/2023    Mammogram  09/12/2023    Hemoglobin A1c  05/29/2024       Problem List Items Addressed This Visit          Cardiac/Vascular    Mixed hyperlipidemia (Chronic)    Current Assessment & Plan     Lipid level stable continue medicine         Primary hypertension (Chronic)    Current Assessment & Plan     Blood pressure stable continue medicine            Endocrine    Hypothyroidism (Chronic)    Current Assessment & Plan     Thyroid level stable continue medication         Type 2 diabetes mellitus with hyperglycemia, with long-term current use of insulin (Chronic)    Current Assessment & Plan     A1c last visit was 7.0 now it is up to 8.4 discussed her medications in  her diabetes she had be an excellent candidate to start Ozempic we will begin the starting dose today give her an injection and samples and she will be back in 3 weeks to follow-up prescription sent in.            Other    RESOLVED: Medicare annual wellness visit, subsequent - Primary    Current Assessment & Plan     Discussed results of examination and laboratory            Health Maintenance Topics with due status: Not Due       Topic Last Completion Date    TETANUS VACCINE 08/21/2018    DEXA Scan 09/03/2020    Colorectal Cancer Screening 10/11/2021    Influenza Vaccine 11/08/2022    Eye Exam 10/19/2023    High Dose Statin 04/10/2024    Lipid Panel 06/17/2024       No future appointments.       Medicare Annual Wellness and Personalized Prevention Plan:   Fall Risk + Home Safety + Hearing Impairment + Depression Screen + Cognitive Impairment Screen + Health Risk Assessment all reviewed.         Advance Care Planning     Date: 06/24/2024  Patient did not wish or was not able to name a surrogate decision maker or provide an Advance Care Plan.           Opioid Screening: Patient medication list reviewed, patient is not taking prescription opioids. Patient is not using additional opioids than prescribed. Patient is at low risk of substance abuse based on this opioid use history.        Signature:  Edenilson Gallego MD  [facility]  [address] [id number]    Date of encounter: 6/24/24    Follow up in about 16 days (around 7/10/2024). In addition to their scheduled follow up, the patient has also been instructed to follow up on as needed basis.

## 2024-06-24 NOTE — ASSESSMENT & PLAN NOTE
A1c last visit was 7.0 now it is up to 8.4 discussed her medications in her diabetes she had be an excellent candidate to start Ozempic we will begin the starting dose today give her an injection and samples and she will be back in 3 weeks to follow-up prescription sent in.

## 2024-07-10 ENCOUNTER — OFFICE VISIT (OUTPATIENT)
Dept: INTERNAL MEDICINE | Facility: CLINIC | Age: 74
End: 2024-07-10
Payer: MEDICARE

## 2024-07-10 ENCOUNTER — LAB VISIT (OUTPATIENT)
Dept: LAB | Facility: HOSPITAL | Age: 74
End: 2024-07-10
Attending: INTERNAL MEDICINE
Payer: MEDICARE

## 2024-07-10 VITALS
OXYGEN SATURATION: 99 % | HEART RATE: 95 BPM | BODY MASS INDEX: 26.96 KG/M2 | SYSTOLIC BLOOD PRESSURE: 124 MMHG | WEIGHT: 142.81 LBS | RESPIRATION RATE: 16 BRPM | HEIGHT: 61 IN | DIASTOLIC BLOOD PRESSURE: 80 MMHG

## 2024-07-10 DIAGNOSIS — Z79.4 TYPE 2 DIABETES MELLITUS WITH HYPERGLYCEMIA, WITH LONG-TERM CURRENT USE OF INSULIN: Primary | Chronic | ICD-10-CM

## 2024-07-10 DIAGNOSIS — I10 PRIMARY HYPERTENSION: Chronic | ICD-10-CM

## 2024-07-10 DIAGNOSIS — E11.65 TYPE 2 DIABETES MELLITUS WITH HYPERGLYCEMIA, WITH LONG-TERM CURRENT USE OF INSULIN: ICD-10-CM

## 2024-07-10 DIAGNOSIS — Z79.4 TYPE 2 DIABETES MELLITUS WITH HYPERGLYCEMIA, WITH LONG-TERM CURRENT USE OF INSULIN: ICD-10-CM

## 2024-07-10 DIAGNOSIS — E11.65 TYPE 2 DIABETES MELLITUS WITH HYPERGLYCEMIA, WITH LONG-TERM CURRENT USE OF INSULIN: Primary | Chronic | ICD-10-CM

## 2024-07-10 LAB
ANION GAP SERPL CALC-SCNC: 8 MEQ/L
BUN SERPL-MCNC: 20.7 MG/DL (ref 9.8–20.1)
CALCIUM SERPL-MCNC: 10 MG/DL (ref 8.4–10.2)
CHLORIDE SERPL-SCNC: 101 MMOL/L (ref 98–107)
CO2 SERPL-SCNC: 29 MMOL/L (ref 23–31)
CREAT SERPL-MCNC: 0.78 MG/DL (ref 0.55–1.02)
CREAT/UREA NIT SERPL: 27
GFR SERPLBLD CREATININE-BSD FMLA CKD-EPI: >60 ML/MIN/1.73/M2
GLUCOSE SERPL-MCNC: 109 MG/DL (ref 82–115)
POTASSIUM SERPL-SCNC: 4.6 MMOL/L (ref 3.5–5.1)
SODIUM SERPL-SCNC: 138 MMOL/L (ref 136–145)

## 2024-07-10 PROCEDURE — 83020 HEMOGLOBIN ELECTROPHORESIS: CPT

## 2024-07-10 PROCEDURE — 36415 COLL VENOUS BLD VENIPUNCTURE: CPT

## 2024-07-10 PROCEDURE — 99213 OFFICE O/P EST LOW 20 MIN: CPT | Mod: ,,, | Performed by: INTERNAL MEDICINE

## 2024-07-10 PROCEDURE — 80048 BASIC METABOLIC PNL TOTAL CA: CPT

## 2024-07-10 NOTE — PROGRESS NOTES
Edenilson Gallego MD        PATIENT NAME: Alexys Madera  : 1950  DATE: 7/10/24  MRN: [id number]      Billing Provider: Edenilson Gallego MD  Level of Service:   Patient PCP Information       Provider PCP Type    Edenilson Gallego MD General            Reason for Visit / Chief Complaint: Results       Update PCP  Update Chief Complaint         History of Present Illness / Problem Focused Workflow     Alexys Madera presents to the clinic with Results     Patient is here follow-up diabetes   Her blood sugar was very elevated at 236 she begin Ozempic starting dose and tolerating it well and feeling better.        Review of Systems   Review of Systems   Constitutional: Negative.    HENT: Negative.     Eyes: Negative.    Respiratory: Negative.     Cardiovascular: Negative.    Gastrointestinal: Negative.    Endocrine: Negative.    Genitourinary: Negative.    Musculoskeletal: Negative.    Integumentary:  Negative.   Neurological: Negative.    Psychiatric/Behavioral: Negative.          Medical / Social / Family History     Past Medical History:   Diagnosis Date    CAD (coronary artery disease) 2022    Hypothyroidism 2022    Mixed hyperlipidemia 2022    DILLON (obstructive sleep apnea) 2022    uses CPAP    PAD (peripheral artery disease) 2022    Primary hypertension 2022    Type 2 diabetes mellitus with hyperglycemia, with long-term current use of insulin 2022       Past Surgical History:   Procedure Laterality Date    CHOLECYSTECTOMY      COLONOSCOPY      CORONARY ARTERY BYPASS GRAFT      3 vessel    ENDARTERECTOMY OF FEMORAL ARTERY Left 3/19/2024    Procedure: ENDARTERECTOMY, FEMORAL;  Surgeon: Tania Elizondo MD;  Location: [facility];  Service: Cardiovascular;  Laterality: Left;  LEFT CFA //  XX    ESOPHAGOGASTRODUODENOSCOPY      HYSTERECTOMY      INSERTION OF STENT INTO PERIPHERAL VESSEL      LEFT HEART CATHETERIZATION      NASAL SEPTUM SURGERY         Social  History  Ms. Madera  reports that she has never smoked. She has never been exposed to tobacco smoke. She has never used smokeless tobacco. She reports that she does not currently use alcohol. She reports that she does not use drugs.    Family History  Ms.'uriah Madera  family history includes PONV in her father.    Medications and Allergies     Medications  Outpatient Medications Marked as Taking for the 7/10/24 encounter (Office Visit) with Edenilson Redmond MD   Medication Sig Dispense Refill    amLODIPine (NORVASC) 5 MG tablet Take 5 mg by mouth once daily.      aspirin (ECOTRIN) 81 MG EC tablet Take 81 mg by mouth once daily.      atorvastatin (LIPITOR) 40 MG tablet Take 40 mg by mouth once daily.      cholecalciferol, vitamin D3, (VITAMIN D3) 50 mcg (2,000 unit) Cap capsule Take 2,000 Units by mouth once daily.      clopidogreL (PLAVIX) 75 mg tablet Take 75 mg by mouth once daily.      empagliflozin (JARDIANCE) 25 mg tablet Take 1 tablet (25 mg total) by mouth once daily. 30 tablet 11    GVOKE HYPOPEN 1-PACK 1 mg/0.2 mL AtIn 2 (two) times daily as needed (hypoglycemia).      hydroCHLOROthiazide (HYDRODIURIL) 25 MG tablet TAKE 1 TABLET BY MOUTH DAILY 90 tablet 6    HYDROcodone-acetaminophen (NORCO) 5-325 mg per tablet Take 1 tablet by mouth every 6 (six) hours as needed for Pain. 30 tablet 0    LANTUS SOLOSTAR U-100 INSULIN glargine 100 units/mL SubQ pen INJECT SUBCUTANEOUSLY 40 UNITS  ONCE DAILY (Patient taking differently: Inject 35 Units into the skin once daily.) 15 mL 8    levothyroxine (SYNTHROID) 112 MCG tablet TAKE 1 TABLET BY MOUTH  DAILY 90 tablet 3    lisinopriL (PRINIVIL,ZESTRIL) 40 MG tablet TAKE 1 TABLET BY MOUTH DAILY 90 tablet 6    metFORMIN (GLUCOPHAGE) 1000 MG tablet TAKE 1 TABLET BY MOUTH TWICE  DAILY 180 tablet 6    metoprolol succinate (TOPROL-XL) 25 MG 24 hr tablet Take 12.5 mg by mouth 2 (two) times daily.      REPATHA PUSHTRONEX 420 mg/3.5 mL Injt SMARTSI SUB-Q Once a Month       [DISCONTINUED] semaglutide (OZEMPIC) 0.25 mg or 0.5 mg (2 mg/3 mL) pen injector Inject 0.25 mg into the skin every 7 days. 4 each 11       Allergies  Review of patient's allergies indicates:  No Known Allergies    Physical Examination     Vitals:    07/10/24 0829   BP: 124/80   Pulse: 95   Resp: 16     Physical Exam  Constitutional:       Appearance: Normal appearance.   HENT:      Head: Normocephalic and atraumatic.      Right Ear: Tympanic membrane normal.      Left Ear: Tympanic membrane normal.      Nose: Nose normal.      Mouth/Throat:      Mouth: Mucous membranes are moist.   Eyes:      Extraocular Movements: Extraocular movements intact.      Pupils: Pupils are equal, round, and reactive to light.   Cardiovascular:      Rate and Rhythm: Normal rate and regular rhythm.      Pulses: Normal pulses.   Pulmonary:      Effort: Pulmonary effort is normal.      Breath sounds: Normal breath sounds.   Abdominal:      General: Abdomen is flat. Bowel sounds are normal.      Palpations: Abdomen is soft.   Musculoskeletal:         General: Normal range of motion.      Cervical back: Normal range of motion and neck supple.   Skin:     General: Skin is warm and dry.   Neurological:      General: No focal deficit present.      Mental Status: She is alert and oriented to person, place, and time.   Psychiatric:         Mood and Affect: Mood normal.         Behavior: Behavior normal.          Assessment and Plan (including Health Maintenance)      Problem List  Smart Sets  Document Outside HM   :    Plan:    ICD-10-CM ICD-9-CM   1. Type 2 diabetes mellitus with hyperglycemia, with long-term current use of insulin  E11.65 250.00    Z79.4 790.29     V58.67   2. Primary hypertension  I10 401.9       Problem List Items Addressed This Visit          Cardiac/Vascular    Primary hypertension (Chronic)       Endocrine    Type 2 diabetes mellitus with hyperglycemia, with long-term current use of insulin - Primary (Chronic)    Relevant  Medications    semaglutide (OZEMPIC) 0.25 mg or 0.5 mg (2 mg/3 mL) pen injector       Orders Placed This Encounter    semaglutide (OZEMPIC) 0.25 mg or 0.5 mg (2 mg/3 mL) pen injector         Health Maintenance Due   Topic Date Due    Hepatitis C Screening  Never done    Diabetes Urine Screening  Never done    RSV Vaccine (Age 60+ and Pregnant patients) (1 - 1-dose 60+ series) Never done    Shingles Vaccine (2 of 3) 12/08/2016    COVID-19 Vaccine (6 - 2023-24 season) 09/01/2023    Mammogram  09/12/2023    Hemoglobin A1c  05/29/2024       Problem List Items Addressed This Visit          Cardiac/Vascular    Primary hypertension (Chronic)       Endocrine    Type 2 diabetes mellitus with hyperglycemia, with long-term current use of insulin - Primary (Chronic)    Relevant Medications    semaglutide (OZEMPIC) 0.25 mg or 0.5 mg (2 mg/3 mL) pen injector       Health Maintenance Topics with due status: Not Due       Topic Last Completion Date    TETANUS VACCINE 08/21/2018    DEXA Scan 09/03/2020    Colorectal Cancer Screening 10/11/2021    Eye Exam 10/19/2023    Influenza Vaccine 10/24/2023    Lipid Panel 06/17/2024    Foot Exam 06/24/2024    High Dose Statin 07/10/2024       Future Appointments   Date Time Provider Department Center   1/27/2025  9:40 AM Edenilson Redmond MD [place] [id number]            Signature:  Edenilson Redmond MD  [facility]  [address] [place] [id number]    Date of encounter: 7/10/24

## 2024-07-12 LAB
HGB A MFR BLD ELPH: 97.2 % (ref 95.8–98)
HGB A2 MFR BLD ELPH: 2.8 % (ref 2–3.3)
HGB F MFR BLD ELPH: 0 % (ref 0–0.9)
HGB FRACT BLD ELPH-IMP: NORMAL
M HPLC HB VARIANT, B: NORMAL

## 2024-07-15 ENCOUNTER — TELEPHONE (OUTPATIENT)
Dept: INTERNAL MEDICINE | Facility: CLINIC | Age: 74
End: 2024-07-15
Payer: MEDICARE

## 2024-07-23 DIAGNOSIS — E11.65 TYPE 2 DIABETES MELLITUS WITH HYPERGLYCEMIA, WITH LONG-TERM CURRENT USE OF INSULIN: Chronic | ICD-10-CM

## 2024-07-23 DIAGNOSIS — Z79.4 TYPE 2 DIABETES MELLITUS WITH HYPERGLYCEMIA, WITH LONG-TERM CURRENT USE OF INSULIN: Chronic | ICD-10-CM

## 2024-07-29 LAB — BCS RECOMMENDATION EXT: NORMAL

## 2024-07-30 ENCOUNTER — PATIENT OUTREACH (OUTPATIENT)
Facility: CLINIC | Age: 74
End: 2024-07-30

## 2024-07-30 NOTE — PROGRESS NOTES
Health Maintenance Topic(s) Outreach Outcomes & Actions Taken:    Breast Cancer Screening - Outreach Outcomes & Actions Taken  : External Records Uploaded & Care Team Updated if Applicable       Additional Notes:  Upload Mammogram Report: [facility] 7/29/2024

## 2024-08-26 ENCOUNTER — TELEPHONE (OUTPATIENT)
Dept: INTERNAL MEDICINE | Facility: CLINIC | Age: 74
End: 2024-08-26
Payer: MEDICARE

## 2024-08-26 DIAGNOSIS — E11.65 TYPE 2 DIABETES MELLITUS WITH HYPERGLYCEMIA, WITH LONG-TERM CURRENT USE OF INSULIN: Primary | ICD-10-CM

## 2024-08-26 DIAGNOSIS — Z79.4 TYPE 2 DIABETES MELLITUS WITH HYPERGLYCEMIA, WITH LONG-TERM CURRENT USE OF INSULIN: Primary | ICD-10-CM

## 2024-08-26 NOTE — TELEPHONE ENCOUNTER
----- Message from Anny Perez sent at 8/26/2024  3:35 PM CDT -----  .Who Called: Alexys Madera    Caller is requesting assistance/information from provider's office.    Symptoms (please be specific): n/a     How long has patient had these symptoms:  n/a    List of preferred pharmacies on file (remove unneeded): [facility] ([facility]) - [place] [address] [address] [facility]   Phone: [phone]  Fax: [phone]    Preferred Method of Contact: Phone Call     Patient's Preferred Phone Number on File: [phone]     Best Call Back Number, if different:    Additional Information: Pt called stating she's been waiting on her semaglutide (OZEMPIC) 0.25 mg or 0.5 mg (2 mg/3 mL) pen injector to be approved (PA). Stated she needs her medication and has 1 week left of medication. Pt would like a cb. Please advise, thank you.

## 2024-08-26 NOTE — TELEPHONE ENCOUNTER
Advised patient I was initiating a PA and would call her when it is ready.   Hypercholesterolemia Monitoring: I explained this is common when taking isotretinoin. We will monitor closely.

## 2024-08-27 RX ORDER — DULAGLUTIDE 0.75 MG/.5ML
0.75 INJECTION, SOLUTION SUBCUTANEOUS
Qty: 4 PEN | Refills: 0 | Status: SHIPPED | OUTPATIENT
Start: 2024-08-27 | End: 2025-08-27

## 2024-09-09 DIAGNOSIS — E03.9 HYPOTHYROIDISM, UNSPECIFIED TYPE: ICD-10-CM

## 2024-09-09 RX ORDER — LEVOTHYROXINE SODIUM 112 UG/1
TABLET ORAL
Qty: 90 TABLET | Refills: 3 | Status: SHIPPED | OUTPATIENT
Start: 2024-09-09

## 2024-09-12 DIAGNOSIS — Z79.4 TYPE 2 DIABETES MELLITUS WITHOUT COMPLICATION, WITH LONG-TERM CURRENT USE OF INSULIN: ICD-10-CM

## 2024-09-12 DIAGNOSIS — E11.9 TYPE 2 DIABETES MELLITUS WITHOUT COMPLICATION, WITH LONG-TERM CURRENT USE OF INSULIN: ICD-10-CM

## 2024-09-12 RX ORDER — INSULIN GLARGINE 100 [IU]/ML
35 INJECTION, SOLUTION SUBCUTANEOUS DAILY
Qty: 30 ML | Refills: 3 | Status: SHIPPED | OUTPATIENT
Start: 2024-09-12

## 2024-10-21 ENCOUNTER — PATIENT OUTREACH (OUTPATIENT)
Facility: CLINIC | Age: 74
End: 2024-10-21
Payer: MEDICARE

## 2024-10-21 NOTE — PROGRESS NOTES
Health Maintenance Topic(s) Outreach Outcomes & Actions Taken:    Lab(s) - Outreach Outcomes & Actions Taken  : External Records Uploaded & Care Team Updated if Applicable         Additional Notes:  Upload A1C Level

## 2024-11-06 DIAGNOSIS — Z79.4 TYPE 2 DIABETES MELLITUS WITH HYPERGLYCEMIA, WITH LONG-TERM CURRENT USE OF INSULIN: Chronic | ICD-10-CM

## 2024-11-06 DIAGNOSIS — E11.65 TYPE 2 DIABETES MELLITUS WITH HYPERGLYCEMIA, WITH LONG-TERM CURRENT USE OF INSULIN: Chronic | ICD-10-CM

## 2024-11-18 ENCOUNTER — TELEPHONE (OUTPATIENT)
Dept: INTERNAL MEDICINE | Facility: CLINIC | Age: 74
End: 2024-11-18
Payer: MEDICARE

## 2024-11-18 NOTE — TELEPHONE ENCOUNTER
----- Message from Lisa sent at 11/18/2024  1:41 PM CST -----  .Who Called: Alexys Madera        Preferred Method of Contact: Phone Call  Patient's Preferred Phone Number on File: [phone]   Best Call Back Number, if different:  Additional Information: pt checking on her paperwork for medication assistance ?she said it been two weeks drop off

## 2025-01-07 ENCOUNTER — TELEPHONE (OUTPATIENT)
Dept: INTERNAL MEDICINE | Facility: CLINIC | Age: 75
End: 2025-01-07
Payer: MEDICARE

## 2025-01-07 NOTE — TELEPHONE ENCOUNTER
----- Message from [employer] sent at 1/7/2025  4:00 PM CST -----  .Who Called: Saige neil [facility]     Caller is requesting assistance/information from provider's office.    Symptoms (please be specific): n/a   How long has patient had these symptoms:  n/a  List of preferred pharmacies on file (remove unneeded): [unfilled]  If different, enter pharmacy into here including location and phone number: n/a      Preferred Method of Contact: Phone Call  Patient's Preferred Phone Number on File:   Best Call Back Number, if different:[phone]  Additional Information: Caller is calling to find out if the forms faxed over were received by the clinic. Attempted BL no answer .

## 2025-01-13 ENCOUNTER — TELEPHONE (OUTPATIENT)
Dept: INTERNAL MEDICINE | Facility: CLINIC | Age: 75
End: 2025-01-13
Payer: MEDICARE

## 2025-01-13 NOTE — TELEPHONE ENCOUNTER
----- Message from [employer]Early sent at 1/13/2025 11:50 AM CST -----  Regarding: lab orders  Who Called: Alexys Madera    Caller is requesting assistance/information from provider's office.    Symptoms (please be specific):    How long has patient had these symptoms:    List of preferred pharmacies on file (remove unneeded): [unfilled]  If different, enter pharmacy into here including location and phone number:       Preferred Method of Contact: Phone Call  Patient's Preferred Phone Number on File: [phone]   Best Call Back Number, if different:  Additional Information: pt is requesting to  copy of lab orders tomorrow for her an d her daughter Brittany Bustillos 12/3/1970

## 2025-01-20 LAB — HBA1C MFR BLD: 8 % (ref 4–6)

## 2025-01-22 ENCOUNTER — PATIENT OUTREACH (OUTPATIENT)
Facility: CLINIC | Age: 75
End: 2025-01-22
Payer: MEDICARE

## 2025-01-22 DIAGNOSIS — E11.65 TYPE 2 DIABETES MELLITUS WITH HYPERGLYCEMIA, WITH LONG-TERM CURRENT USE OF INSULIN: Primary | ICD-10-CM

## 2025-01-22 DIAGNOSIS — Z79.4 TYPE 2 DIABETES MELLITUS WITH HYPERGLYCEMIA, WITH LONG-TERM CURRENT USE OF INSULIN: Primary | ICD-10-CM

## 2025-01-22 NOTE — PROGRESS NOTES
Population Health Outreach.    Health Maintenance Topic(s) Outreach Outcomes & Actions Taken:    Lab(s) - Outreach Outcomes & Actions Taken  : Overdue Lab(s) Ordered and Primary Care Follow Up Visit Scheduled        Additional Notes:  DM Urine added to future labs.   Note placed in appointment notes for lab to collect with upcoming blood work orders.     Care Team and outside immunizations updated.

## 2025-01-24 ENCOUNTER — TELEPHONE (OUTPATIENT)
Dept: INTERNAL MEDICINE | Facility: CLINIC | Age: 75
End: 2025-01-24
Payer: MEDICARE

## 2025-01-27 ENCOUNTER — OFFICE VISIT (OUTPATIENT)
Dept: INTERNAL MEDICINE | Facility: CLINIC | Age: 75
End: 2025-01-27
Payer: MEDICARE

## 2025-01-27 ENCOUNTER — TELEPHONE (OUTPATIENT)
Dept: INTERNAL MEDICINE | Facility: CLINIC | Age: 75
End: 2025-01-27

## 2025-01-27 VITALS
SYSTOLIC BLOOD PRESSURE: 118 MMHG | OXYGEN SATURATION: 98 % | DIASTOLIC BLOOD PRESSURE: 80 MMHG | BODY MASS INDEX: 27 KG/M2 | HEIGHT: 61 IN | WEIGHT: 143 LBS | RESPIRATION RATE: 18 BRPM | HEART RATE: 71 BPM

## 2025-01-27 DIAGNOSIS — E11.65 TYPE 2 DIABETES MELLITUS WITH HYPERGLYCEMIA, WITH LONG-TERM CURRENT USE OF INSULIN: Primary | Chronic | ICD-10-CM

## 2025-01-27 DIAGNOSIS — I25.10 CORONARY ARTERY DISEASE INVOLVING NATIVE HEART WITHOUT ANGINA PECTORIS, UNSPECIFIED VESSEL OR LESION TYPE: Chronic | ICD-10-CM

## 2025-01-27 DIAGNOSIS — E55.9 VITAMIN D DEFICIENCY: ICD-10-CM

## 2025-01-27 DIAGNOSIS — I10 PRIMARY HYPERTENSION: Chronic | ICD-10-CM

## 2025-01-27 DIAGNOSIS — Z79.4 TYPE 2 DIABETES MELLITUS WITH HYPERGLYCEMIA, WITH LONG-TERM CURRENT USE OF INSULIN: Primary | Chronic | ICD-10-CM

## 2025-01-27 DIAGNOSIS — I73.9 PAD (PERIPHERAL ARTERY DISEASE): ICD-10-CM

## 2025-01-27 DIAGNOSIS — E03.9 ACQUIRED HYPOTHYROIDISM: Chronic | ICD-10-CM

## 2025-01-27 RX ORDER — TIRZEPATIDE 2.5 MG/.5ML
2.5 INJECTION, SOLUTION SUBCUTANEOUS
Qty: 2 ML | Refills: 11 | Status: SHIPPED | OUTPATIENT
Start: 2025-01-27 | End: 2025-01-29 | Stop reason: SDUPTHER

## 2025-01-27 RX ORDER — TIRZEPATIDE 2.5 MG/.5ML
2.5 INJECTION, SOLUTION SUBCUTANEOUS
Qty: 4 PEN | Refills: 11 | Status: SHIPPED | OUTPATIENT
Start: 2025-01-27 | End: 2025-01-27

## 2025-01-27 NOTE — PROGRESS NOTES
Edenilson Gallego MD        PATIENT NAME: Alexys Madera  : 1950  DATE: 25  MRN: [id number]      Billing Provider: Edenilson Gallego MD  Level of Service: [place] OFFICE/OUTPT VISIT, JOEY, SIMON IV, 30-39 MIN  Patient PCP Information       Provider PCP Type    Edenilson Gallego MD General            Reason for Visit / Chief Complaint: Diabetes       Update PCP  Update Chief Complaint         History of Present Illness / Problem Focused Workflow     Alexys Madera presents to the clinic with Diabetes     Patient is here for six-month follow-up   Overall she is feeling well   Her insurance will not approve her Ozempic and she has not been able to take it           Review of Systems   Review of Systems   Constitutional: Negative.    HENT: Negative.     Eyes: Negative.    Respiratory: Negative.     Cardiovascular: Negative.    Gastrointestinal: Negative.    Endocrine: Negative.    Genitourinary: Negative.    Musculoskeletal: Negative.    Integumentary:  Negative.   Neurological: Negative.    Psychiatric/Behavioral: Negative.          Medical / Social / Family History     Past Medical History:   Diagnosis Date    CAD (coronary artery disease) 2022    Hypothyroidism 2022    Mixed hyperlipidemia 2022    DILLON (obstructive sleep apnea) 2022    uses CPAP    PAD (peripheral artery disease) 2022    Primary hypertension 2022    Type 2 diabetes mellitus with hyperglycemia, with long-term current use of insulin 2022       Past Surgical History:   Procedure Laterality Date    CHOLECYSTECTOMY      COLONOSCOPY  10/11/2021    Dr. Jacob Bird    CORONARY ARTERY BYPASS GRAFT      3 vessel    ENDARTERECTOMY OF FEMORAL ARTERY Left 2024    Procedure: ENDARTERECTOMY, FEMORAL;  Surgeon: Tania Elizondo MD;  Location: [facility];  Service: Cardiovascular;  Laterality: Left;  LEFT CFA //  XX    ESOPHAGOGASTRODUODENOSCOPY  10/11/2021    Dr. Jacob Bird    HYSTERECTOMY      INSERTION OF  STENT INTO PERIPHERAL VESSEL      LEFT HEART CATHETERIZATION      NASAL SEPTUM SURGERY         Social History  Ms. Madera  reports that she has never smoked. She has never been exposed to tobacco smoke. She has never used smokeless tobacco. She reports that she does not currently use alcohol. She reports that she does not use drugs.    Family History  Ms.'s Madera  family history includes Cancer in her father and mother; Diabetes in her father; Hearing loss in her father and mother; PONV in her father.    Medications and Allergies     Medications  Outpatient Medications Marked as Taking for the 1/27/25 encounter (Office Visit) with Edenilson Redmond MD   Medication Sig Dispense Refill    amLODIPine (NORVASC) 5 MG tablet Take 5 mg by mouth once daily.      aspirin (ECOTRIN) 81 MG EC tablet Take 81 mg by mouth once daily.      atorvastatin (LIPITOR) 40 MG tablet Take 40 mg by mouth once daily.      cholecalciferol, vitamin D3, (VITAMIN D3) 50 mcg (2,000 unit) Cap capsule Take 2,000 Units by mouth once daily.      clopidogreL (PLAVIX) 75 mg tablet Take 75 mg by mouth once daily.      empagliflozin (JARDIANCE) 25 mg tablet Take 1 tablet (25 mg total) by mouth once daily. 30 tablet 11    GVOKE HYPOPEN 1-PACK 1 mg/0.2 mL AtIn 2 (two) times daily as needed (hypoglycemia).      hydroCHLOROthiazide (HYDRODIURIL) 25 MG tablet TAKE 1 TABLET BY MOUTH DAILY 90 tablet 6    HYDROcodone-acetaminophen (NORCO) 5-325 mg per tablet Take 1 tablet by mouth every 6 (six) hours as needed for Pain. 30 tablet 0    LANTUS SOLOSTAR U-100 INSULIN 100 unit/mL (3 mL) InPn pen Inject 35 Units into the skin once daily. 30 mL 3    levothyroxine (SYNTHROID) 112 MCG tablet TAKE 1 TABLET BY MOUTH DAILY 90 tablet 3    lisinopriL (PRINIVIL,ZESTRIL) 40 MG tablet TAKE 1 TABLET BY MOUTH DAILY 90 tablet 6    metFORMIN (GLUCOPHAGE) 1000 MG tablet TAKE 1 TABLET BY MOUTH TWICE  DAILY 180 tablet 6    metoprolol succinate (TOPROL-XL) 25 MG 24 hr tablet Take  12.5 mg by mouth 2 (two) times daily.      REPATHA PUSHTRONEX 420 mg/3.5 mL Injt SMARTSI SUB-Q Once a Month         Allergies  Review of patient's allergies indicates:  No Known Allergies    Physical Examination     Vitals:    25 0920   BP: 118/80   Pulse: 71   Resp: 18     Physical Exam  Constitutional:       Appearance: Normal appearance.   HENT:      Head: Normocephalic and atraumatic.      Right Ear: Tympanic membrane normal.      Left Ear: Tympanic membrane normal.      Nose: Nose normal.      Mouth/Throat:      Mouth: Mucous membranes are moist.   Eyes:      Extraocular Movements: Extraocular movements intact.      Pupils: Pupils are equal, round, and reactive to light.   Cardiovascular:      Rate and Rhythm: Normal rate and regular rhythm.      Pulses: Normal pulses.           Dorsalis pedis pulses are 2+ on the left side.        Posterior tibial pulses are 2+ on the right side and 2+ on the left side.   Pulmonary:      Effort: Pulmonary effort is normal.      Breath sounds: Normal breath sounds.   Abdominal:      General: Abdomen is flat. Bowel sounds are normal.      Palpations: Abdomen is soft.   Musculoskeletal:         General: Normal range of motion.      Cervical back: Normal range of motion and neck supple.   Feet:      Right foot:      Protective Sensation: 2 sites tested.  2 sites sensed.      Skin integrity: Skin integrity normal.      Left foot:      Protective Sensation:   2 sites sensed.      Skin integrity: Skin integrity normal.   Skin:     General: Skin is warm and dry.   Neurological:      General: No focal deficit present.      Mental Status: She is alert and oriented to person, place, and time.   Psychiatric:         Mood and Affect: Mood normal.         Behavior: Behavior normal.          Assessment and Plan (including Health Maintenance)      Problem List  Smart Sets  Document Outside HM   :    Plan:    ICD-10-CM ICD-9-CM   1. Type 2 diabetes mellitus with hyperglycemia, with  long-term current use of insulin  E11.65 250.00    Z79.4 790.29     V58.67   2. Primary hypertension  I10 401.9   3. Acquired hypothyroidism  E03.9 244.9   4. PAD (peripheral artery disease)  I73.9 443.9   5. Coronary artery disease involving native heart without angina pectoris, unspecified vessel or lesion type  I25.10 414.01   6. Vitamin D deficiency  E55.9 268.9       Problem List Items Addressed This Visit          Cardiac/Vascular    CAD (coronary artery disease) (Chronic)     Cardiovascular wise stable   She had a good evaluation with a cardiologist's with no issues         Primary hypertension (Chronic)     Blood pressure excellent continue treatment         PAD (peripheral artery disease) (Chronic)     Ultrasound showed good flow bilateral            Endocrine    Vitamin D deficiency    Hypothyroidism (Chronic)     Thyroid level stable continue dosage         Type 2 diabetes mellitus with hyperglycemia, with long-term current use of insulin - Primary (Chronic)     A1c at 8.0   We will try to get her Mounjaro prescription sent in   Discussed possibilities of her talking to her pharmacist about ways to get medication.                   Health Maintenance Due   Topic Date Due    Hepatitis C Screening  Never done    Diabetes Urine Screening  Never done    RSV Vaccine (Age 60+ and Pregnant patients) (1 - Risk 60-74 years 1-dose series) Never done    Shingles Vaccine (2 of 3) 12/08/2016    Hemoglobin A1c  05/29/2024    COVID-19 Vaccine (7 - 2024-25 season) 11/27/2024       Problem List Items Addressed This Visit          Cardiac/Vascular    CAD (coronary artery disease) (Chronic)    Overview      -s/p CABG         Current Assessment & Plan     Cardiovascular wise stable   She had a good evaluation with a cardiologist's with no issues         Primary hypertension (Chronic)    Current Assessment & Plan     Blood pressure excellent continue treatment         PAD (peripheral artery disease) (Chronic)    Current  Assessment & Plan     Ultrasound showed good flow bilateral            Endocrine    Vitamin D deficiency    Hypothyroidism (Chronic)    Current Assessment & Plan     Thyroid level stable continue dosage         Type 2 diabetes mellitus with hyperglycemia, with long-term current use of insulin - Primary (Chronic)    Current Assessment & Plan     A1c at 8.0   We will try to get her Mounjaro prescription sent in   Discussed possibilities of her talking to her pharmacist about ways to get medication.            Health Maintenance Topics with due status: Not Due       Topic Last Completion Date    TETANUS VACCINE 08/21/2018    Colorectal Cancer Screening 10/11/2021    Lipid Panel 06/17/2024    Foot Exam 06/24/2024    High Dose Statin 07/10/2024    Mammogram 07/29/2024    DEXA Scan 07/29/2024    Diabetic Eye Exam 11/05/2024       Future Appointments   Date Time Provider Department Center   1/27/2025  9:40 AM Edenilson Redmond MD [facility] [place] [address]   9/22/2025  9:40 AM Edenilson Redmond MD [place] [address]        I spent a total of 37 minutes on the day of the visit.This includes face to face time and non-face to face time preparing to see the patient (eg, review of tests), obtaining and/or reviewing separately obtained history, documenting clinical information in the electronic or other health record, independently interpreting results and communicating results to the patient/family/caregiver, or care coordinator.      Signature:  Edenilson Redmond MD  [facility]  [address] [facility] [id number]    Date of encounter: 1/27/25

## 2025-01-27 NOTE — ASSESSMENT & PLAN NOTE
A1c at 8.0   We will try to get her Mounjaro prescription sent in   Discussed possibilities of her talking to her pharmacist about ways to get medication.

## 2025-01-27 NOTE — TELEPHONE ENCOUNTER
Called patient to inform her the PA for mounjaro was approved. Unable to leave voicemail. If patient calls back and I am not here please inform patient PA was approved.

## 2025-01-27 NOTE — PROGRESS NOTES
Health Maintenance Topic(s) Outreach Outcomes & Actions Taken:    Lab(s) - Outreach Outcomes & Actions Taken  : External Records Uploaded & Care Team Updated if Applicable       Additional Notes:  A1C 1/20/25

## 2025-01-29 ENCOUNTER — TELEPHONE (OUTPATIENT)
Dept: INTERNAL MEDICINE | Facility: CLINIC | Age: 75
End: 2025-01-29
Payer: MEDICARE

## 2025-01-29 DIAGNOSIS — E11.65 TYPE 2 DIABETES MELLITUS WITH HYPERGLYCEMIA, WITH LONG-TERM CURRENT USE OF INSULIN: Chronic | ICD-10-CM

## 2025-01-29 DIAGNOSIS — Z79.4 TYPE 2 DIABETES MELLITUS WITH HYPERGLYCEMIA, WITH LONG-TERM CURRENT USE OF INSULIN: Chronic | ICD-10-CM

## 2025-01-29 RX ORDER — TIRZEPATIDE 2.5 MG/.5ML
2.5 INJECTION, SOLUTION SUBCUTANEOUS
Qty: 6 ML | Refills: 3 | Status: SHIPPED | OUTPATIENT
Start: 2025-01-29

## 2025-01-29 NOTE — TELEPHONE ENCOUNTER
----- Message from Mitzi sent at 1/29/2025  4:27 PM CST -----  Who Called: Alexys Madera    Caller is requesting assistance/information from provider's office.    Symptoms (please be specific):     How long has patient had these symptoms:     List of preferred pharmacies on file (remove unneeded): [unfilled]  If different, enter pharmacy into here including location and phone number:        Preferred Method of Contact: Phone Call  Patient's Preferred Phone Number on File: [phone]   Best Call Back Number, if different:  Additional Information:  pt would like to speak with nurse about med

## 2025-02-04 ENCOUNTER — TELEPHONE (OUTPATIENT)
Dept: INTERNAL MEDICINE | Facility: CLINIC | Age: 75
End: 2025-02-04
Payer: MEDICARE

## 2025-02-04 NOTE — TELEPHONE ENCOUNTER
----- Message from Mitzi sent at 2/4/2025 11:05 AM CST -----  Who Called: Alexys Madera    Caller is requesting assistance/information from provider's office.    Symptoms (please be specific):     How long has patient had these symptoms:     List of preferred pharmacies on file (remove unneeded):  [facility] - [address] [facility]  If different, enter pharmacy into here including location and phone number:         Preferred Method of Contact: Phone Call  Patient's Preferred Phone Number on File: [phone]   Best Call Back Number, if different:  Additional Information:  pt would like to speak with nurse about med  tirzepatide (MOUNJARO) 2.5 mg/0.5 mL PnIj , pt stated that pharmacy stated that  didn't put enough info on script to be filed out

## 2025-02-04 NOTE — TELEPHONE ENCOUNTER
Advised patient that it was approved through [facility] and she needs to call [facility] and see why they are telling her it is denied

## 2025-02-04 NOTE — TELEPHONE ENCOUNTER
----- Message from [employer] sent at 2/4/2025 12:23 PM CST -----  Who Called: Alexys Madera    Caller is requesting assistance/information from provider's office.    Symptoms (please be specific): n/a   How long has patient had these symptoms:  n/a  List of preferred pharmacies on file (remove unneeded): [unfilled]  If different, enter pharmacy into here including location and phone number: n/a      Preferred Method of Contact: Phone Call  Patient's Preferred Phone Number on File: [phone]   Best Call Back Number, if different:  Additional Information: pt advice, FYI, pt called with:    [employer] member# [id number],     RX bin# [id number],     RX PCN# [id number],    RX Grp# [id number], please advise, thanks

## 2025-06-11 LAB
LEFT EYE DM RETINOPATHY: POSITIVE
RIGHT EYE DM RETINOPATHY: POSITIVE

## 2025-06-30 ENCOUNTER — PATIENT OUTREACH (OUTPATIENT)
Facility: CLINIC | Age: 75
End: 2025-06-30
Payer: MEDICARE

## 2025-06-30 NOTE — PROGRESS NOTES
Health Maintenance Topic(s) Outreach Outcomes & Actions Taken:    Eye Exam - Outreach Outcomes & Actions Taken  : Diabetic Eye External Records Uploaded, Care Team & History Updated if Applicable     Additional Notes:  DM eye exam 6/29/25

## 2025-07-17 ENCOUNTER — PATIENT OUTREACH (OUTPATIENT)
Facility: CLINIC | Age: 75
End: 2025-07-17
Payer: MEDICARE

## 2025-07-17 NOTE — PROGRESS NOTES
Population Health Outreach.    [facility] REPORT  DUE:      Urine Screening  Hemoglobin A1c  Lipid Panel       Next Primary Care Visit Date: 9/22/2025 Annual Wellness Visit   Overdue labs above ordered with upcoming wellness labs.

## 2025-07-21 DIAGNOSIS — E11.65 TYPE 2 DIABETES MELLITUS WITH HYPERGLYCEMIA, WITH LONG-TERM CURRENT USE OF INSULIN: ICD-10-CM

## 2025-07-21 DIAGNOSIS — I10 PRIMARY HYPERTENSION: ICD-10-CM

## 2025-07-21 DIAGNOSIS — Z79.4 TYPE 2 DIABETES MELLITUS WITH HYPERGLYCEMIA, WITH LONG-TERM CURRENT USE OF INSULIN: ICD-10-CM

## 2025-07-21 RX ORDER — HYDROCHLOROTHIAZIDE 25 MG/1
25 TABLET ORAL
Qty: 90 TABLET | Refills: 3 | Status: SHIPPED | OUTPATIENT
Start: 2025-07-21

## 2025-07-21 RX ORDER — LISINOPRIL 40 MG/1
40 TABLET ORAL
Qty: 90 TABLET | Refills: 3 | Status: SHIPPED | OUTPATIENT
Start: 2025-07-21

## 2025-07-21 RX ORDER — METFORMIN HYDROCHLORIDE 1000 MG/1
1000 TABLET ORAL 2 TIMES DAILY
Qty: 180 TABLET | Refills: 3 | Status: SHIPPED | OUTPATIENT
Start: 2025-07-21

## 2025-07-22 DIAGNOSIS — E11.65 TYPE 2 DIABETES MELLITUS WITH HYPERGLYCEMIA, WITH LONG-TERM CURRENT USE OF INSULIN: Chronic | ICD-10-CM

## 2025-07-22 DIAGNOSIS — Z79.4 TYPE 2 DIABETES MELLITUS WITH HYPERGLYCEMIA, WITH LONG-TERM CURRENT USE OF INSULIN: Chronic | ICD-10-CM

## 2025-07-22 RX ORDER — EMPAGLIFLOZIN 25 MG/1
25 TABLET, FILM COATED ORAL
Qty: 90 TABLET | Refills: 2 | Status: SHIPPED | OUTPATIENT
Start: 2025-07-22

## 2025-07-23 ENCOUNTER — TELEPHONE (OUTPATIENT)
Dept: INTERNAL MEDICINE | Facility: CLINIC | Age: 75
End: 2025-07-23
Payer: MEDICARE

## 2025-07-23 DIAGNOSIS — I10 PRIMARY HYPERTENSION: Primary | Chronic | ICD-10-CM

## 2025-07-23 DIAGNOSIS — E78.2 MIXED HYPERLIPIDEMIA: Chronic | ICD-10-CM

## 2025-07-23 RX ORDER — ATORVASTATIN CALCIUM 40 MG/1
40 TABLET, FILM COATED ORAL DAILY
Qty: 90 TABLET | Refills: 3 | Status: SHIPPED | OUTPATIENT
Start: 2025-07-23

## 2025-07-23 RX ORDER — METOPROLOL SUCCINATE 25 MG/1
12.5 TABLET, EXTENDED RELEASE ORAL 2 TIMES DAILY
Qty: 90 TABLET | Refills: 3 | Status: SHIPPED | OUTPATIENT
Start: 2025-07-23

## 2025-07-23 NOTE — TELEPHONE ENCOUNTER
Copied from CRM #[id number]. Topic: Medications - Medication Refill  >> Jul 23, 2025  9:54 AM Anny wrote:  .Who Called: Alexys Madera    Refill or New Rx:Refill    RX Name and Strength: metFORMIN (GLUCOPHAGE) 1000 MG tablet    How is the patient currently taking it? (ex. 1XDay): Sig - Route: TAKE 1 TABLET BY MOUTH TWICE  DAILY - Oral    Is this a 30 day or 90 day RX: N/A    Local or Mail Order: Mail Order    List of preferred pharmacies on file (remove unneeded): [address]   Phone: [phone]  Fax: [phone]    Ordering Provider: Dr. Gallego    Preferred Method of Contact: Phone Call    Patient's Preferred Phone Number on File: [phone]     Best Call Back Number, if different:    Additional Information: N/A           Refill or New Rx:Refill    RX Name and Strength: atorvastatin (LIPITOR) 40 MG tablet    How is the patient currently taking it? (ex. 1XDay): Sig - Route: Take 40 mg by mouth once daily. - Oral    Is this a 30 day or 90 day RX: N/A    Local or Mail Order: Mail Order    List of preferred pharmacies on file (remove unneeded): [facility] [address]   Phone: [phone]  Fax: [phone]    Ordering Provider: Dr. Gallego    Preferred Method of Contact: Phone Call    Patient's Preferred Phone Number on File: [phone]     Best Call Back Number, if different:    Additional Information: N/A               Refill or New Rx:Refill    RX Name and Strength: metoprolol succinate (TOPROL-XL) 25 MG 24 hr tablet    How is the patient currently taking it? (ex. 1XDay): metoprolol succinate (TOPROL-XL) 25 MG 24 hr tablet    Is this a 30 day or 90 day RX: N/A    Local or Mail Order: Mail Order    List of preferred pharmacies on file (remove unneeded): [facility] [address]   Phone: [phone]  Fax: [phone]    Ordering Provider: N/A    Preferred Method of Contact: Phone  Call    Patient's Preferred Phone Number on File: [phone]     Best Call Back Number, if different:    Additional Information: N/A

## 2025-07-23 NOTE — TELEPHONE ENCOUNTER
Metformin Sent to [facility] pharmacy on 07/21/25    Lipitor and Metoprolol is not prescribed by Dr. Reyna, according to Epic    **Do you want to take over the scripts?

## (undated) DEVICE — TRAY CATH FOL SIL URIMTR 16FR

## (undated) DEVICE — APPLIER LIGACLIP SM 9.38IN

## (undated) DEVICE — SUT MONOCRYL PLUS UD 3-0 27

## (undated) DEVICE — TUBING SUC UNIV W/CONN 12FT

## (undated) DEVICE — SUT VICRYL 2-0 36 CT-1

## (undated) DEVICE — DRAPE STERI INCISE 23X23IN

## (undated) DEVICE — LOOP STERION MAXI YEL 1X406MM

## (undated) DEVICE — SUT PROLENE 5-0 36IN C-1

## (undated) DEVICE — SOL NORMAL USPCA 0.9%

## (undated) DEVICE — SUT PROLENE 6-0 C-1 30IN BL

## (undated) DEVICE — GLOVE PROTEXIS PI SYN SURG 7.5

## (undated) DEVICE — BAG MEDI-PLAST DECANTER C-FLOW

## (undated) DEVICE — GLOVE PROTEXIS HYDROGEL SZ6.5

## (undated) DEVICE — PENCIL ELECSURG ROCKER 15FT

## (undated) DEVICE — BOWL STERILE LARGE 32OZ

## (undated) DEVICE — TUBE SUCTION 6.5 TIP 6FR

## (undated) DEVICE — CLIP LIGATING HEMOCLP SMALL

## (undated) DEVICE — TUBE SUCTION MEDI-VAC STERILE

## (undated) DEVICE — KIT SURGICAL TURNOVER

## (undated) DEVICE — SPONGE LAP 18X18 PREWASHED

## (undated) DEVICE — WIPE MERCL POLYVIL ACETL 3X3IN

## (undated) DEVICE — SPONGE GAUZE 16PLY 4X4

## (undated) DEVICE — ELECTRODE PATIENT RETURN DISP

## (undated) DEVICE — GLOVE PROTEXIS NEOPRN SZ8

## (undated) DEVICE — GLOVE PROTEXIS BLUE LATEX 7

## (undated) DEVICE — GLOVE SURG BIOGEL LATEX SZ 7.5

## (undated) DEVICE — Device

## (undated) DEVICE — GOWN SMARTSLEEVE AAMI LVL4 XXL